# Patient Record
Sex: FEMALE | Race: WHITE | NOT HISPANIC OR LATINO | Employment: FULL TIME | URBAN - METROPOLITAN AREA
[De-identification: names, ages, dates, MRNs, and addresses within clinical notes are randomized per-mention and may not be internally consistent; named-entity substitution may affect disease eponyms.]

---

## 2023-04-13 PROBLEM — F32.A ANXIETY AND DEPRESSION: Status: ACTIVE | Noted: 2023-04-13

## 2023-04-13 PROBLEM — F41.1 GENERALIZED ANXIETY DISORDER WITH PANIC ATTACKS: Status: ACTIVE | Noted: 2023-04-13

## 2023-04-13 PROBLEM — E05.00 GRAVES DISEASE: Status: ACTIVE | Noted: 2023-04-13

## 2023-04-13 PROBLEM — F90.9 ADULT ADHD (ATTENTION DEFICIT HYPERACTIVITY DISORDER): Status: ACTIVE | Noted: 2023-04-13

## 2023-04-13 PROBLEM — F41.0 GENERALIZED ANXIETY DISORDER WITH PANIC ATTACKS: Status: ACTIVE | Noted: 2023-04-13

## 2023-04-13 PROBLEM — I10 PRIMARY HYPERTENSION: Status: ACTIVE | Noted: 2023-04-13

## 2023-04-13 PROBLEM — F41.9 ANXIETY AND DEPRESSION: Status: ACTIVE | Noted: 2023-04-13

## 2023-04-13 RX ORDER — AMLODIPINE BESYLATE 5 MG/1
1 TABLET ORAL DAILY
COMMUNITY
Start: 2022-10-25

## 2023-04-13 RX ORDER — ATOMOXETINE 40 MG/1
1 CAPSULE ORAL
COMMUNITY
Start: 2022-12-06 | End: 2023-04-14

## 2023-04-13 RX ORDER — TRAZODONE HYDROCHLORIDE 100 MG/1
50 TABLET ORAL NIGHTLY
COMMUNITY
Start: 2022-10-25 | End: 2023-11-13 | Stop reason: ALTCHOICE

## 2023-04-13 RX ORDER — VENLAFAXINE HYDROCHLORIDE 150 MG/1
1 CAPSULE, EXTENDED RELEASE ORAL DAILY
COMMUNITY
Start: 2022-03-10 | End: 2023-04-28

## 2023-04-14 ENCOUNTER — OFFICE VISIT (OUTPATIENT)
Dept: PRIMARY CARE | Facility: CLINIC | Age: 38
End: 2023-04-14
Payer: COMMERCIAL

## 2023-04-14 VITALS
DIASTOLIC BLOOD PRESSURE: 78 MMHG | SYSTOLIC BLOOD PRESSURE: 104 MMHG | BODY MASS INDEX: 23.74 KG/M2 | WEIGHT: 134 LBS | RESPIRATION RATE: 16 BRPM | HEART RATE: 68 BPM | HEIGHT: 63 IN

## 2023-04-14 DIAGNOSIS — F90.9 ADULT ADHD (ATTENTION DEFICIT HYPERACTIVITY DISORDER): Primary | ICD-10-CM

## 2023-04-14 DIAGNOSIS — F32.A ANXIETY AND DEPRESSION: ICD-10-CM

## 2023-04-14 DIAGNOSIS — E05.00 GRAVES DISEASE: ICD-10-CM

## 2023-04-14 DIAGNOSIS — I10 PRIMARY HYPERTENSION: ICD-10-CM

## 2023-04-14 DIAGNOSIS — F41.1 GENERALIZED ANXIETY DISORDER WITH PANIC ATTACKS: ICD-10-CM

## 2023-04-14 DIAGNOSIS — F41.9 ANXIETY AND DEPRESSION: ICD-10-CM

## 2023-04-14 DIAGNOSIS — F41.0 GENERALIZED ANXIETY DISORDER WITH PANIC ATTACKS: ICD-10-CM

## 2023-04-14 PROCEDURE — 3078F DIAST BP <80 MM HG: CPT | Performed by: INTERNAL MEDICINE

## 2023-04-14 PROCEDURE — 99213 OFFICE O/P EST LOW 20 MIN: CPT | Performed by: INTERNAL MEDICINE

## 2023-04-14 PROCEDURE — 3074F SYST BP LT 130 MM HG: CPT | Performed by: INTERNAL MEDICINE

## 2023-04-14 RX ORDER — ATOMOXETINE 80 MG/1
80 CAPSULE ORAL DAILY
Qty: 30 CAPSULE | Refills: 0 | Status: SHIPPED | OUTPATIENT
Start: 2023-04-14 | End: 2023-04-20

## 2023-04-14 ASSESSMENT — ANXIETY QUESTIONNAIRES
IF YOU CHECKED OFF ANY PROBLEMS ON THIS QUESTIONNAIRE, HOW DIFFICULT HAVE THESE PROBLEMS MADE IT FOR YOU TO DO YOUR WORK, TAKE CARE OF THINGS AT HOME, OR GET ALONG WITH OTHER PEOPLE: NOT DIFFICULT AT ALL
6. BECOMING EASILY ANNOYED OR IRRITABLE: NOT AT ALL
4. TROUBLE RELAXING: NOT AT ALL
1. FEELING NERVOUS, ANXIOUS, OR ON EDGE: NOT AT ALL
5. BEING SO RESTLESS THAT IT IS HARD TO SIT STILL: NOT AT ALL
3. WORRYING TOO MUCH ABOUT DIFFERENT THINGS: NOT AT ALL
7. FEELING AFRAID AS IF SOMETHING AWFUL MIGHT HAPPEN: NOT AT ALL
2. NOT BEING ABLE TO STOP OR CONTROL WORRYING: NOT AT ALL
GAD7 TOTAL SCORE: 0

## 2023-04-14 ASSESSMENT — PATIENT HEALTH QUESTIONNAIRE - PHQ9
2. FEELING DOWN, DEPRESSED OR HOPELESS: NOT AT ALL
1. LITTLE INTEREST OR PLEASURE IN DOING THINGS: NOT AT ALL
SUM OF ALL RESPONSES TO PHQ9 QUESTIONS 1 AND 2: 0

## 2023-04-14 NOTE — ASSESSMENT & PLAN NOTE
Minimal response seen with Strattera 40 mg daily will increase to 80 mg daily and reevaluate no side effects if no efficacy we will consider Concerta or methylphenidate patient with 2 children with ADHD and autism

## 2023-04-14 NOTE — ASSESSMENT & PLAN NOTE
Currently in remission at this time monitor thyroid functions on yearly basis clinically euthyroid in December no signs or symptoms of overt hyperthyroidism

## 2023-04-14 NOTE — ASSESSMENT & PLAN NOTE
Currently in remission with venlafaxine 150 mg 1 tablet daily and trazodone 100 mg nightly for sleep

## 2023-04-14 NOTE — PROGRESS NOTES
"Subjective   Patient ID: Arlene Moore is a 38 y.o. female who presents for Follow-up (Discuss the ADHD medication).    HPI     Review of Systems    Objective   /78   Pulse 68   Resp 16   Ht 1.6 m (5' 3\")   Wt 60.8 kg (134 lb)   BMI 23.74 kg/m²     Physical Exam    Assessment/Plan          "

## 2023-04-14 NOTE — PROGRESS NOTES
"Subjective   Reason for Visit: Arlene Moore is an 38 y.o. female here for a fu  visit.     Past Medical, Surgical, and Family History reviewed and updated in chart.    Reviewed all medications by prescribing practitioner or clinical pharmacist (such as prescriptions, OTCs, herbal therapies and supplements) and documented in the medical record.    HPI    Patient Care Team:  Jason Luna DO as PCP - General  Jason Luna DO as PCP - [facility] PCP     Review of Systems   All other systems reviewed and are negative.      Objective   Vitals:  /78   Pulse 68   Resp 16   Ht 1.6 m (5' 3\")   Wt 60.8 kg (134 lb)   BMI 23.74 kg/m²       Physical Exam  Vitals and nursing note reviewed.   Constitutional:       General: She is not in acute distress.     Appearance: Normal appearance. She is well-developed. She is not toxic-appearing.   HENT:      Head: Normocephalic and atraumatic.      Right Ear: Tympanic membrane and external ear normal.      Left Ear: Tympanic membrane and external ear normal.      Nose: Nose normal.      Mouth/Throat:      Mouth: Mucous membranes are moist.      Pharynx: Oropharynx is clear. No oropharyngeal exudate or posterior oropharyngeal erythema.      Tonsils: No tonsillar exudate. 2+ on the right. 2+ on the left.   Eyes:      Extraocular Movements: Extraocular movements intact.      Conjunctiva/sclera: Conjunctivae normal.   Cardiovascular:      Rate and Rhythm: Normal rate and regular rhythm.      Pulses: Normal pulses.      Heart sounds: Normal heart sounds. No murmur heard.  Pulmonary:      Effort: Pulmonary effort is normal.      Breath sounds: Normal breath sounds.   Abdominal:      General: Abdomen is flat. Bowel sounds are normal.      Palpations: Abdomen is soft.   Musculoskeletal:      Cervical back: Neck supple.   Lymphadenopathy:      Cervical: No cervical adenopathy.   Skin:     General: Skin is warm and dry.      Findings: No rash.   Neurological:      Mental " Status: She is alert. Mental status is at baseline.   Psychiatric:         Mood and Affect: Mood normal.         Behavior: Behavior normal.         Thought Content: Thought content normal.         Judgment: Judgment normal.         Assessment/Plan   Problem List Items Addressed This Visit          Circulatory    Primary hypertension    Current Assessment & Plan     blood pressure control seen on amlodipine 5 mg daily continue         Relevant Orders    Follow Up In [facility] - PCP       Endocrine/Metabolic    Graves disease    Current Assessment & Plan     Currently in remission at this time monitor thyroid functions on yearly basis clinically euthyroid in December no signs or symptoms of overt hyperthyroidism         Relevant Orders    Follow Up In [facility] - PCP       Other    Adult ADHD (attention deficit hyperactivity disorder) - Primary    Current Assessment & Plan     Minimal response seen with Strattera 40 mg daily will increase to 80 mg daily and reevaluate no side effects if no efficacy we will consider Concerta or methylphenidate patient with 2 children with ADHD and autism         Relevant Medications    atomoxetine (Strattera) 80 mg capsule    Other Relevant Orders    Follow Up In [facility] - PCP    Anxiety and depression    Current Assessment & Plan     Currently in remission with venlafaxine 150 mg 1 tablet daily and trazodone 100 mg nightly for sleep         Relevant Orders    Follow [facility] - PCP    Follow [facility]    Generalized anxiety disorder with panic attacks    Current Assessment & Plan     Stable on venlafaxine 150 mg 1 tablet daily         Relevant Orders    Follow Up [facility] - PCP    Follow Up [facility]

## 2023-04-20 ENCOUNTER — TELEPHONE (OUTPATIENT)
Dept: PRIMARY CARE | Facility: CLINIC | Age: 38
End: 2023-04-20
Payer: COMMERCIAL

## 2023-04-20 DIAGNOSIS — F90.9 ADULT ADHD (ATTENTION DEFICIT HYPERACTIVITY DISORDER): Primary | ICD-10-CM

## 2023-04-20 RX ORDER — ATOMOXETINE 100 MG/1
100 CAPSULE ORAL DAILY
Qty: 30 CAPSULE | Refills: 11 | Status: SHIPPED | OUTPATIENT
Start: 2023-04-20 | End: 2023-08-10

## 2023-04-20 NOTE — TELEPHONE ENCOUNTER
She called in cause what Dr. Luna sent for the Atomoxetine 80 mg is what she has been taking and she wanted to know if he is going to increase it please call her at [phone]

## 2023-04-28 DIAGNOSIS — F41.0 PANIC DISORDER (EPISODIC PAROXYSMAL ANXIETY): ICD-10-CM

## 2023-04-28 DIAGNOSIS — F41.1 GENERALIZED ANXIETY DISORDER: ICD-10-CM

## 2023-04-28 RX ORDER — VENLAFAXINE HYDROCHLORIDE 150 MG/1
CAPSULE, EXTENDED RELEASE ORAL
Qty: 30 CAPSULE | Refills: 5 | Status: SHIPPED | OUTPATIENT
Start: 2023-04-28 | End: 2023-08-24 | Stop reason: SDUPTHER

## 2023-07-14 ENCOUNTER — APPOINTMENT (OUTPATIENT)
Dept: PRIMARY CARE | Facility: CLINIC | Age: 38
End: 2023-07-14
Payer: COMMERCIAL

## 2023-08-03 ENCOUNTER — TELEPHONE (OUTPATIENT)
Dept: PRIMARY CARE | Facility: CLINIC | Age: 38
End: 2023-08-03
Payer: COMMERCIAL

## 2023-08-10 ENCOUNTER — OFFICE VISIT (OUTPATIENT)
Dept: PRIMARY CARE | Facility: CLINIC | Age: 38
End: 2023-08-10
Payer: COMMERCIAL

## 2023-08-10 VITALS
DIASTOLIC BLOOD PRESSURE: 88 MMHG | HEART RATE: 64 BPM | HEIGHT: 63 IN | WEIGHT: 134 LBS | BODY MASS INDEX: 23.74 KG/M2 | SYSTOLIC BLOOD PRESSURE: 128 MMHG

## 2023-08-10 DIAGNOSIS — F90.9 ADULT ADHD (ATTENTION DEFICIT HYPERACTIVITY DISORDER): Primary | ICD-10-CM

## 2023-08-10 DIAGNOSIS — I10 PRIMARY HYPERTENSION: ICD-10-CM

## 2023-08-10 DIAGNOSIS — F32.A ANXIETY AND DEPRESSION: ICD-10-CM

## 2023-08-10 DIAGNOSIS — F41.9 ANXIETY AND DEPRESSION: ICD-10-CM

## 2023-08-10 DIAGNOSIS — E05.00 GRAVES DISEASE: ICD-10-CM

## 2023-08-10 DIAGNOSIS — F41.0 GENERALIZED ANXIETY DISORDER WITH PANIC ATTACKS: ICD-10-CM

## 2023-08-10 DIAGNOSIS — F41.1 GENERALIZED ANXIETY DISORDER WITH PANIC ATTACKS: ICD-10-CM

## 2023-08-10 PROCEDURE — 3079F DIAST BP 80-89 MM HG: CPT | Performed by: INTERNAL MEDICINE

## 2023-08-10 PROCEDURE — 3074F SYST BP LT 130 MM HG: CPT | Performed by: INTERNAL MEDICINE

## 2023-08-10 PROCEDURE — 99213 OFFICE O/P EST LOW 20 MIN: CPT | Performed by: INTERNAL MEDICINE

## 2023-08-10 RX ORDER — LISDEXAMFETAMINE DIMESYLATE 30 MG/1
30 CAPSULE ORAL EVERY MORNING
Qty: 30 CAPSULE | Refills: 0 | Status: SHIPPED | OUTPATIENT
Start: 2023-08-10 | End: 2023-08-10

## 2023-08-10 ASSESSMENT — ENCOUNTER SYMPTOMS
SLEEP DISTURBANCE: 1
NERVOUS/ANXIOUS: 1
DECREASED CONCENTRATION: 1

## 2023-08-10 NOTE — ASSESSMENT & PLAN NOTE
Stable on amlodipine 5 mg daily element of whitecoat hypertension and nervousness anxiousness contributing to elevated blood pressures in office

## 2023-08-10 NOTE — PROGRESS NOTES
"Subjective   Reason for Visit: Arlene Moore is an 38 y.o. female here for a visit.     Past Medical, Surgical, and Family History reviewed and updated in chart.    Reviewed all medications by prescribing practitioner or clinical pharmacist (such as prescriptions, OTCs, herbal therapies and supplements) and documented in the medical record.    History of anxiety depression symptoms controlled on venlafaxine.  Sleep has improved taking half doses of trazodone only as needed.  No significant response seen with trial of atomoxetine.  Discussed trial of placing patient on scheduled medication such as Vyvanse or Adderall in treatment of adult ADHD.  2 children diagnosed with early ADHD.  Patient has been functioning okay at work but at times concentration and difficulty in getting things done has been causing increased stress and she gets particularly angry with inability to complete necessary tasks at home and work at times.  She admits to recreational medicinal use of cannabis to alleviate stress.  She does not wish to start any scheduled medication at this time but is amenable to individual behavioral health counseling        Patient Care Team:  Jason Luna DO as PCP - General  Jason Luna DO as PCP - [facility] PCP     Review of Systems   Psychiatric/Behavioral:  Positive for decreased concentration and sleep disturbance. The patient is nervous/anxious.        Objective   Vitals:  /88 (BP Location: Right arm, Patient Position: Sitting)   Pulse 64   Ht 1.6 m (5' 3\")   Wt 60.8 kg (134 lb)   BMI 23.74 kg/m²       Physical Exam  Vitals and nursing note reviewed.   Constitutional:       General: She is not in acute distress.     Appearance: Normal appearance. She is well-developed. She is not toxic-appearing.   HENT:      Head: Normocephalic and atraumatic.      Right Ear: Tympanic membrane and external ear normal.      Left Ear: Tympanic membrane and external ear normal.      Nose: Nose normal.     "  Mouth/Throat:      Mouth: Mucous membranes are moist.      Pharynx: Oropharynx is clear. No oropharyngeal exudate or posterior oropharyngeal erythema.      Tonsils: No tonsillar exudate. 2+ on the right. 2+ on the left.   Eyes:      Extraocular Movements: Extraocular movements intact.      Conjunctiva/sclera: Conjunctivae normal.   Cardiovascular:      Rate and Rhythm: Normal rate and regular rhythm.      Pulses: Normal pulses.      Heart sounds: Normal heart sounds. No murmur heard.  Pulmonary:      Effort: Pulmonary effort is normal.      Breath sounds: Normal breath sounds.   Abdominal:      General: Abdomen is flat. Bowel sounds are normal.      Palpations: Abdomen is soft.   Musculoskeletal:      Cervical back: Neck supple.   Lymphadenopathy:      Cervical: No cervical adenopathy.   Skin:     General: Skin is warm and dry.      Findings: No rash.   Neurological:      Mental Status: She is alert. Mental status is at baseline.   Psychiatric:         Mood and Affect: Mood normal.         Behavior: Behavior normal.         Thought Content: Thought content normal.         Judgment: Judgment normal.         Assessment/Plan   Problem List Items Addressed This Visit       Adult ADHD (attention deficit hyperactivity disorder) - Primary    Current Assessment & Plan     Patient defers advanced therapy at this time no response seen with atomoxetine at higher doses patient defers starting stimulant medication but is amenable to behavioral health services and assisting with coping and life skills at home particularly to help with family situation with her children who are also undergoing significant behavioral health disturbances.  She has a single parent but does have good family support from her parents denies wanting to injure herself or her children but feels angry all the time due to life stress reevaluate in 3 months behavioral health resubmitted         Relevant Orders    Follow [facility] In [facility] - PCP -  Established    Follow Up [facility] - [facility]    Anxiety and depression    Current Assessment & Plan     Currently improved on medication referral to behavioral health         Relevant Orders    Follow Up In [facility] - [facility]    Generalized anxiety disorder with panic attacks    Current Assessment & Plan     Patient with control of anxiety and anxiety symptoms on venlafaxine continue         Relevant Orders    Follow Up In [facility] - [facility]    Graves disease    Current Assessment & Plan     Currently in remission continue to monitor with lab work         Primary hypertension    Current Assessment & Plan     Stable on amlodipine 5 mg daily element of whitecoat hypertension and nervousness anxiousness contributing to elevated blood pressures in office

## 2023-08-10 NOTE — PROGRESS NOTES
"Subjective   Patient ID: Arlene Moore is a 38 y.o. female who presents for Follow-up.    HPI     Review of Systems    Objective   /88 (BP Location: Right arm, Patient Position: Sitting)   Pulse 64   Ht 1.6 m (5' 3\")   Wt 60.8 kg (134 lb)   BMI 23.74 kg/m²     Physical Exam    Assessment/Plan          "

## 2023-08-10 NOTE — ASSESSMENT & PLAN NOTE
Patient defers advanced therapy at this time no response seen with atomoxetine at higher doses patient defers starting stimulant medication but is amenable to behavioral health services and assisting with coping and life skills at home particularly to help with family situation with her children who are also undergoing significant behavioral health disturbances.  She has a single parent but does have good family support from her parents denies wanting to injure herself or her children but feels angry all the time due to life stress reevaluate in 3 months behavioral health resubmitted

## 2023-08-24 ENCOUNTER — PATIENT MESSAGE (OUTPATIENT)
Dept: PRIMARY CARE | Facility: CLINIC | Age: 38
End: 2023-08-24
Payer: COMMERCIAL

## 2023-08-24 DIAGNOSIS — F41.1 GENERALIZED ANXIETY DISORDER: ICD-10-CM

## 2023-08-24 DIAGNOSIS — F41.0 PANIC DISORDER (EPISODIC PAROXYSMAL ANXIETY): ICD-10-CM

## 2023-08-24 RX ORDER — VENLAFAXINE HYDROCHLORIDE 150 MG/1
150 CAPSULE, EXTENDED RELEASE ORAL
Qty: 30 CAPSULE | Refills: 5 | Status: SHIPPED | OUTPATIENT
Start: 2023-08-24 | End: 2023-11-13 | Stop reason: SDUPTHER

## 2023-09-25 ENCOUNTER — TELEPHONE (OUTPATIENT)
Dept: PRIMARY CARE | Facility: CLINIC | Age: 38
End: 2023-09-25
Payer: COMMERCIAL

## 2023-09-25 NOTE — TELEPHONE ENCOUNTER
Patient went to Urgent Care a week ago Saturday for a UTI and now it's back and she is having burning and pressure.     **Patient isn't sure of the name of medication she was given but it was stronger then Macrobid

## 2023-09-26 DIAGNOSIS — R30.9 PAIN WITH URINATION: ICD-10-CM

## 2023-09-26 DIAGNOSIS — R31.9 URINARY TRACT INFECTION WITH HEMATURIA, SITE UNSPECIFIED: ICD-10-CM

## 2023-09-26 DIAGNOSIS — N39.0 URINARY TRACT INFECTION WITH HEMATURIA, SITE UNSPECIFIED: ICD-10-CM

## 2023-09-29 LAB — URINE CULTURE: ABNORMAL

## 2023-10-02 DIAGNOSIS — N30.00 ACUTE CYSTITIS WITHOUT HEMATURIA: Primary | ICD-10-CM

## 2023-10-05 ENCOUNTER — OFFICE VISIT (OUTPATIENT)
Dept: PRIMARY CARE | Facility: CLINIC | Age: 38
End: 2023-10-05
Payer: COMMERCIAL

## 2023-10-05 DIAGNOSIS — R30.0 BURNING WITH URINATION: ICD-10-CM

## 2023-10-05 DIAGNOSIS — N30.00 ACUTE CYSTITIS WITHOUT HEMATURIA: ICD-10-CM

## 2023-10-05 DIAGNOSIS — R30.0 DYSURIA: ICD-10-CM

## 2023-10-05 DIAGNOSIS — N39.0 URINARY TRACT INFECTION WITH HEMATURIA, SITE UNSPECIFIED: ICD-10-CM

## 2023-10-05 DIAGNOSIS — R31.9 URINARY TRACT INFECTION WITH HEMATURIA, SITE UNSPECIFIED: ICD-10-CM

## 2023-10-05 LAB
POC APPEARANCE, URINE: ABNORMAL
POC BILIRUBIN, URINE: ABNORMAL
POC BLOOD, URINE: ABNORMAL
POC COLOR, URINE: YELLOW
POC GLUCOSE, URINE: ABNORMAL MG/DL
POC KETONES, URINE: ABNORMAL MG/DL
POC LEUKOCYTES, URINE: ABNORMAL
POC NITRITE,URINE: POSITIVE
POC PH, URINE: 5 PH
POC PROTEIN, URINE: ABNORMAL MG/DL
POC SPECIFIC GRAVITY, URINE: 1.02
POC UROBILINOGEN, URINE: 0.2 EU/DL

## 2023-10-05 PROCEDURE — 81002 URINALYSIS NONAUTO W/O SCOPE: CPT | Performed by: INTERNAL MEDICINE

## 2023-10-05 PROCEDURE — 87186 SC STD MICRODIL/AGAR DIL: CPT

## 2023-10-05 PROCEDURE — 87086 URINE CULTURE/COLONY COUNT: CPT

## 2023-10-05 RX ORDER — SULFAMETHOXAZOLE AND TRIMETHOPRIM 800; 160 MG/1; MG/1
1 TABLET ORAL 2 TIMES DAILY
Qty: 6 TABLET | Refills: 0 | Status: SHIPPED | OUTPATIENT
Start: 2023-10-05 | End: 2023-10-08

## 2023-10-05 NOTE — PROGRESS NOTES
Subjective   Patient ID: Arlene Moore is a 38 y.o. female who presents for Nurse Visit (UTI Symptoms ).    HPI     Review of Systems    Objective   There were no vitals taken for this visit.    Physical Exam    Assessment/Plan

## 2023-10-08 LAB — BACTERIA UR CULT: ABNORMAL

## 2023-10-18 ENCOUNTER — PATIENT MESSAGE (OUTPATIENT)
Dept: PRIMARY CARE | Facility: CLINIC | Age: 38
End: 2023-10-18
Payer: COMMERCIAL

## 2023-10-18 DIAGNOSIS — N30.00 ACUTE CYSTITIS WITHOUT HEMATURIA: Primary | ICD-10-CM

## 2023-10-18 RX ORDER — SULFAMETHOXAZOLE AND TRIMETHOPRIM 800; 160 MG/1; MG/1
1 TABLET ORAL 2 TIMES DAILY
Qty: 6 TABLET | Refills: 0 | Status: SHIPPED | OUTPATIENT
Start: 2023-10-18 | End: 2023-10-21

## 2023-11-07 ENCOUNTER — PATIENT MESSAGE (OUTPATIENT)
Dept: PRIMARY CARE | Facility: CLINIC | Age: 38
End: 2023-11-07
Payer: COMMERCIAL

## 2023-11-07 DIAGNOSIS — N30.00 ACUTE CYSTITIS WITHOUT HEMATURIA: Primary | ICD-10-CM

## 2023-11-07 RX ORDER — SULFAMETHOXAZOLE AND TRIMETHOPRIM 800; 160 MG/1; MG/1
1 TABLET ORAL 2 TIMES DAILY
Qty: 6 TABLET | Refills: 0 | Status: SHIPPED | OUTPATIENT
Start: 2023-11-07 | End: 2023-11-13 | Stop reason: ALTCHOICE

## 2023-11-08 DIAGNOSIS — Z23 FLU VACCINE NEED: ICD-10-CM

## 2023-11-13 ENCOUNTER — OFFICE VISIT (OUTPATIENT)
Dept: PRIMARY CARE | Facility: CLINIC | Age: 38
End: 2023-11-13
Payer: COMMERCIAL

## 2023-11-13 VITALS
HEART RATE: 68 BPM | HEIGHT: 63 IN | DIASTOLIC BLOOD PRESSURE: 82 MMHG | BODY MASS INDEX: 24.45 KG/M2 | SYSTOLIC BLOOD PRESSURE: 132 MMHG | WEIGHT: 138 LBS

## 2023-11-13 DIAGNOSIS — F41.0 PANIC DISORDER (EPISODIC PAROXYSMAL ANXIETY): ICD-10-CM

## 2023-11-13 DIAGNOSIS — F41.1 GENERALIZED ANXIETY DISORDER: ICD-10-CM

## 2023-11-13 DIAGNOSIS — Z23 FLU VACCINE NEED: ICD-10-CM

## 2023-11-13 DIAGNOSIS — I10 PRIMARY HYPERTENSION: Primary | ICD-10-CM

## 2023-11-13 DIAGNOSIS — E05.00 GRAVES DISEASE: ICD-10-CM

## 2023-11-13 DIAGNOSIS — N39.0 RECURRENT UTI (URINARY TRACT INFECTION): ICD-10-CM

## 2023-11-13 DIAGNOSIS — F90.9 ADULT ADHD (ATTENTION DEFICIT HYPERACTIVITY DISORDER): ICD-10-CM

## 2023-11-13 LAB
POC APPEARANCE, URINE: CLEAR
POC BILIRUBIN, URINE: NEGATIVE
POC BLOOD, URINE: ABNORMAL
POC COLOR, URINE: ABNORMAL
POC GLUCOSE, URINE: ABNORMAL MG/DL
POC KETONES, URINE: ABNORMAL MG/DL
POC LEUKOCYTES, URINE: NEGATIVE
POC NITRITE,URINE: NEGATIVE
POC PH, URINE: 6 PH
POC PROTEIN, URINE: ABNORMAL MG/DL
POC SPECIFIC GRAVITY, URINE: >=1.03
POC UROBILINOGEN, URINE: 0.2 EU/DL

## 2023-11-13 PROCEDURE — 90471 IMMUNIZATION ADMIN: CPT | Performed by: INTERNAL MEDICINE

## 2023-11-13 PROCEDURE — 90686 IIV4 VACC NO PRSV 0.5 ML IM: CPT | Performed by: INTERNAL MEDICINE

## 2023-11-13 PROCEDURE — 3075F SYST BP GE 130 - 139MM HG: CPT | Performed by: INTERNAL MEDICINE

## 2023-11-13 PROCEDURE — 81002 URINALYSIS NONAUTO W/O SCOPE: CPT | Performed by: INTERNAL MEDICINE

## 2023-11-13 PROCEDURE — 3079F DIAST BP 80-89 MM HG: CPT | Performed by: INTERNAL MEDICINE

## 2023-11-13 PROCEDURE — 99213 OFFICE O/P EST LOW 20 MIN: CPT | Performed by: INTERNAL MEDICINE

## 2023-11-13 PROCEDURE — 87086 URINE CULTURE/COLONY COUNT: CPT

## 2023-11-13 RX ORDER — CLONIDINE 0.1 MG/24H
PATCH, EXTENDED RELEASE TRANSDERMAL
Qty: 12 PATCH | Refills: 3 | Status: SHIPPED | OUTPATIENT
Start: 2023-11-13 | End: 2024-04-19 | Stop reason: SINTOL

## 2023-11-13 RX ORDER — VENLAFAXINE HYDROCHLORIDE 150 MG/1
150 CAPSULE, EXTENDED RELEASE ORAL
Qty: 180 CAPSULE | Refills: 3 | Status: SHIPPED | OUTPATIENT
Start: 2023-11-13 | End: 2024-11-12

## 2023-11-13 RX ORDER — NITROFURANTOIN 25; 75 MG/1; MG/1
100 CAPSULE ORAL 2 TIMES DAILY
Qty: 30 CAPSULE | Refills: 0 | Status: SHIPPED | OUTPATIENT
Start: 2023-11-13 | End: 2023-12-13

## 2023-11-13 NOTE — ASSESSMENT & PLAN NOTE
Continue venlafaxine  mg daily patient having some episodes of heat intolerance we will check thyroid function placed on clonidine reevaluate for Graves' hyperthyroidism with blood work reevaluate in 3 months

## 2023-11-13 NOTE — PROGRESS NOTES
"Subjective   Patient ID: Arlene Moore is a 38 y.o. female who presents for Follow-up (Reoccurring UTI).    HPI     Review of Systems    Objective   /82 (BP Location: Right arm, Patient Position: Sitting)   Pulse 68   Ht 1.6 m (5' 3\")   Wt 62.6 kg (138 lb)   BMI 24.45 kg/m²     Physical Exam    Assessment/Plan          "

## 2023-11-13 NOTE — ASSESSMENT & PLAN NOTE
Did not tolerate trials of medication most likely related to level of anxiety stabilized with venlafaxine

## 2023-11-13 NOTE — PROGRESS NOTES
"Subjective   Reason for Visit: Arlene Moore is an 38 y.o. female here for a fu visit.     Past Medical, Surgical, and Family History reviewed and updated in chart.    Reviewed all medications by prescribing practitioner or clinical pharmacist (such as prescriptions, OTCs, herbal therapies and supplements) and documented in the medical record.    HPI    Patient Care Team:  Jason Luna DO as PCP - General  Jason Luna DO as PCP - [facility] PCP     Review of Systems   All other systems reviewed and are negative.      Objective   Vitals:  /82 (BP Location: Right arm, Patient Position: Sitting)   Pulse 68   Ht 1.6 m (5' 3\")   Wt 62.6 kg (138 lb)   BMI 24.45 kg/m²       Physical Exam  Vitals and nursing note reviewed.   Constitutional:       General: She is not in acute distress.     Appearance: Normal appearance. She is well-developed. She is not toxic-appearing.   HENT:      Head: Normocephalic and atraumatic.      Right Ear: Tympanic membrane and external ear normal.      Left Ear: Tympanic membrane and external ear normal.      Nose: Nose normal.      Mouth/Throat:      Mouth: Mucous membranes are moist.      Pharynx: Oropharynx is clear. No oropharyngeal exudate or posterior oropharyngeal erythema.      Tonsils: No tonsillar exudate. 2+ on the right. 2+ on the left.   Eyes:      Extraocular Movements: Extraocular movements intact.      Conjunctiva/sclera: Conjunctivae normal.   Cardiovascular:      Rate and Rhythm: Normal rate and regular rhythm.      Pulses: Normal pulses.      Heart sounds: Normal heart sounds. No murmur heard.  Pulmonary:      Effort: Pulmonary effort is normal.      Breath sounds: Normal breath sounds.   Abdominal:      General: Abdomen is flat. Bowel sounds are normal.      Palpations: Abdomen is soft.   Musculoskeletal:      Cervical back: Neck supple.   Lymphadenopathy:      Cervical: No cervical adenopathy.   Skin:     General: Skin is warm and dry.      Findings: No " rash.   Neurological:      Mental Status: She is alert. Mental status is at baseline.   Psychiatric:         Mood and Affect: Mood normal.         Behavior: Behavior normal.         Thought Content: Thought content normal.         Judgment: Judgment normal.         Assessment/Plan   Problem List Items Addressed This Visit       Adult ADHD (attention deficit hyperactivity disorder)    Current Assessment & Plan     Did not tolerate trials of medication most likely related to level of anxiety stabilized with venlafaxine         Relevant Medications    cloNIDine (Catapres-TTS-1) 0.1 mg/24 hr patch    Other Relevant Orders    Urinalysis with Reflex Microscopic    Referral to Gynecology    CBC and Auto Differential    Tsh With Reflex To Free T4 If Abnormal    Comprehensive metabolic panel    Lipid Panel    Albumin, urine, random    Follow Up In [facility] - PCP - Established    Generalized anxiety disorder    Current Assessment & Plan     Continue venlafaxine  mg daily patient having some episodes of heat intolerance we will check thyroid function placed on clonidine reevaluate for Graves' hyperthyroidism with blood work reevaluate in 3 months         Relevant Medications    venlafaxine XR (Effexor-XR) 150 mg 24 hr capsule    cloNIDine (Catapres-TTS-1) 0.1 mg/24 hr patch    Other Relevant Orders    Urinalysis with Reflex Microscopic    Referral to Gynecology    CBC and Auto Differential    Tsh With Reflex To Free T4 If Abnormal    Comprehensive metabolic panel    Lipid Panel    Albumin, urine, random    Follow Up In [facility] - PCP - Established    Graves disease    Current Assessment & Plan     Reevaluate cardiometabolic function and thyroid clinically euthyroid at this time         Relevant Medications    cloNIDine (Catapres-TTS-1) 0.1 mg/24 hr patch    Other Relevant Orders    Urinalysis with Reflex Microscopic    Referral to Gynecology    CBC and Auto Differential    Tsh With Reflex To  Free T4 If Abnormal    Comprehensive metabolic panel    Lipid Panel    Albumin, urine, random    Follow Up In [facility] - PCP - Established    Primary hypertension - Primary    Current Assessment & Plan     Continues to be mildly elevated in office we will add clonidine patch TTS 1 weekly in the setting of vasomotor symptoms most likely related to higher dose venlafaxine given for management of anxiety with panic features stable         Relevant Orders    Follow Up In [facility] - PCP - Established    Recurrent UTI (urinary tract infection)    Current Assessment & Plan     Patient with IUD with intercourse inducing UTIs recurrent E. coli sensitive repeat urine culture recently treated over the weekend with Bactrim will prescribe prophylactic nitrofurantoin 100 mg to take after intercourse and reevaluate may also consider low-dose therapy to prevent recurrent urethritis referral to gynecologist for cervical cancer screening follow-up 4 years with IUD         Relevant Medications    cloNIDine (Catapres-TTS-1) 0.1 mg/24 hr patch    nitrofurantoin, macrocrystal-monohydrate, (Macrobid) 100 mg capsule    Other Relevant Orders    Urinalysis with Reflex Microscopic    Referral to Gynecology    CBC and Auto Differential    Tsh With Reflex To Free T4 If Abnormal    Comprehensive metabolic panel    Lipid Panel    Albumin, urine, random    Follow Up In [facility] - PCP - Established    Urine Culture    POCT UA (nonautomated) manually resulted (Completed)    Flu vaccine need    Relevant Medications    cloNIDine (Catapres-TTS-1) 0.1 mg/24 hr patch    Other Relevant Orders    Urinalysis with Reflex Microscopic    Referral to Gynecology    CBC and Auto Differential    Tsh With Reflex To Free T4 If Abnormal    Comprehensive metabolic panel    Lipid Panel    Albumin, urine, random    Follow Up In [facility] - PCP - Established    Panic disorder (episodic paroxysmal anxiety)    Current  Assessment & Plan     Stable at this time         Relevant Medications    venlafaxine XR (Effexor-XR) 150 mg 24 hr capsule    cloNIDine (Catapres-TTS-1) 0.1 mg/24 hr patch    Other Relevant Orders    Urinalysis with Reflex Microscopic    Referral to Gynecology    CBC and Auto Differential    Tsh With Reflex To Free T4 If Abnormal    Comprehensive metabolic panel    Lipid Panel    Albumin, urine, random    Follow Up In [facility] - PCP - Established

## 2023-11-13 NOTE — ASSESSMENT & PLAN NOTE
Patient with IUD with intercourse inducing UTIs recurrent E. coli sensitive repeat urine culture recently treated over the weekend with Bactrim will prescribe prophylactic nitrofurantoin 100 mg to take after intercourse and reevaluate may also consider low-dose therapy to prevent recurrent urethritis referral to gynecologist for cervical cancer screening follow-up 4 years with IUD

## 2023-11-13 NOTE — ASSESSMENT & PLAN NOTE
Continues to be mildly elevated in office we will add clonidine patch TTS 1 weekly in the setting of vasomotor symptoms most likely related to higher dose venlafaxine given for management of anxiety with panic features stable continue with amlodipine 5 mg daily check urine microalbumin level check lab work no signs or symptoms of overt Graves' disease at this time

## 2023-11-14 LAB — BACTERIA UR CULT: NORMAL

## 2023-12-22 ENCOUNTER — APPOINTMENT (OUTPATIENT)
Dept: OBSTETRICS AND GYNECOLOGY | Facility: CLINIC | Age: 38
End: 2023-12-22
Payer: COMMERCIAL

## 2024-01-28 PROBLEM — Z01.419 WELL WOMAN EXAM: Status: ACTIVE | Noted: 2024-01-28

## 2024-01-29 ENCOUNTER — OFFICE VISIT (OUTPATIENT)
Dept: OBSTETRICS AND GYNECOLOGY | Facility: CLINIC | Age: 39
End: 2024-01-29
Payer: COMMERCIAL

## 2024-01-29 VITALS
DIASTOLIC BLOOD PRESSURE: 74 MMHG | HEIGHT: 63 IN | BODY MASS INDEX: 24.8 KG/M2 | SYSTOLIC BLOOD PRESSURE: 118 MMHG | WEIGHT: 140 LBS

## 2024-01-29 DIAGNOSIS — F90.9 ADULT ADHD (ATTENTION DEFICIT HYPERACTIVITY DISORDER): ICD-10-CM

## 2024-01-29 DIAGNOSIS — F41.1 GENERALIZED ANXIETY DISORDER: ICD-10-CM

## 2024-01-29 DIAGNOSIS — F41.0 PANIC DISORDER (EPISODIC PAROXYSMAL ANXIETY): ICD-10-CM

## 2024-01-29 DIAGNOSIS — Z01.419 WELL WOMAN EXAM: Primary | ICD-10-CM

## 2024-01-29 DIAGNOSIS — N39.0 RECURRENT UTI (URINARY TRACT INFECTION): ICD-10-CM

## 2024-01-29 DIAGNOSIS — Z23 FLU VACCINE NEED: ICD-10-CM

## 2024-01-29 DIAGNOSIS — Z30.432 ENCOUNTER FOR REMOVAL OF INTRAUTERINE CONTRACEPTIVE DEVICE (IUD): ICD-10-CM

## 2024-01-29 DIAGNOSIS — E05.00 GRAVES DISEASE: ICD-10-CM

## 2024-01-29 PROCEDURE — 58301 REMOVE INTRAUTERINE DEVICE: CPT | Performed by: OBSTETRICS & GYNECOLOGY

## 2024-01-29 PROCEDURE — 3078F DIAST BP <80 MM HG: CPT | Performed by: OBSTETRICS & GYNECOLOGY

## 2024-01-29 PROCEDURE — 3074F SYST BP LT 130 MM HG: CPT | Performed by: OBSTETRICS & GYNECOLOGY

## 2024-01-29 PROCEDURE — 87624 HPV HI-RISK TYP POOLED RSLT: CPT

## 2024-01-29 PROCEDURE — 99385 PREV VISIT NEW AGE 18-39: CPT | Performed by: OBSTETRICS & GYNECOLOGY

## 2024-01-29 PROCEDURE — 4004F PT TOBACCO SCREEN RCVD TLK: CPT | Performed by: OBSTETRICS & GYNECOLOGY

## 2024-01-29 PROCEDURE — 88142 CYTOPATH C/V THIN LAYER: CPT

## 2024-01-29 NOTE — PROGRESS NOTES
Subjective   Patient ID: Arlene Moore is a 38 y.o. female who presents for Annual Exam (IUD removal and annual.).  HPI  38-year-old here for her annual physical exam.  Patient with monthly menses lasting 6 to 7 days.  Patient is otherwise tolerating the Mirena IUD well.  Her new partner has had a vasectomy desires removal of the IUD.  No other concerns.      Objective   Physical Exam  Exam conducted with a chaperone present.   Constitutional:       Appearance: Normal appearance.   Cardiovascular:      Rate and Rhythm: Normal rate and regular rhythm.   Pulmonary:      Effort: Pulmonary effort is normal.      Breath sounds: Normal breath sounds.   Chest:   Breasts:     Right: Normal. No mass or tenderness.      Left: Normal. No mass or tenderness.   Abdominal:      Palpations: Abdomen is soft. There is no mass.      Tenderness: There is no abdominal tenderness.   Genitourinary:     General: Normal vulva.      Vagina: Normal. No lesions.      Cervix: No lesion.      Uterus: Normal. Not enlarged and not tender.       Adnexa: Right adnexa normal and left adnexa normal.        Right: No mass or tenderness.          Left: No mass or tenderness.     Musculoskeletal:      Cervical back: Neck supple.   Skin:     General: Skin is warm and dry.   Neurological:      Mental Status: She is alert and oriented to person, place, and time.   Psychiatric:         Mood and Affect: Mood normal.         Behavior: Behavior normal.         IUD Removal    Date/Time: 1/29/2024 2:24 PM    Performed by: Clif Ulloa MD  Authorized by: Clif Ulloa MD    Consent:     Consent obtained:  Written    Consent given by:  Patient    Procedure risks and benefits discussed: yes    Procedure:     Removed with no complications: yes    Comments:      Partner with a vasectomy, desires removal of the IUD    Assessment/Plan   Problem List Items Addressed This Visit             ICD-10-CM    Adult ADHD (attention deficit hyperactivity disorder)  F90.9    Generalized anxiety disorder F41.1    Graves disease E05.00    Recurrent UTI (urinary tract infection) N39.0    Flu vaccine need Z23    Panic disorder (episodic paroxysmal anxiety) F41.0    Well woman exam - Primary Z01.419     --ANNUAL EXAM: Patient doing well with no concerns.  --CONTRACEPTIONS:  patient with the mirena IUD inserted December 2019, currently with monthly menses lasting 6 to 7 days.  New partner has had a vasectomy, and desires removal of her IUD.  IUD removed and tolerated well.  --Patient with history of a PE: Currently on no blood thinners  --normal PAP and HPV in 2016, will repeat today  --Patient is P3    PLAN:  Pap and HPV performed  Mirena IUD removed  Follow-up in 1 year         Relevant Orders    THINPREP PAP     Other Visit Diagnoses         Codes    Encounter for removal of intrauterine contraceptive device (IUD)     Z30.215

## 2024-01-29 NOTE — ASSESSMENT & PLAN NOTE
--ANNUAL EXAM: Patient doing well with no concerns.  --CONTRACEPTIONS:  patient with the mirena IUD inserted December 2019, currently with monthly menses lasting 6 to 7 days.  New partner has had a vasectomy, and desires removal of her IUD.  IUD removed and tolerated well.  --Patient with history of a PE: Currently on no blood thinners  --normal PAP and HPV in 2016, will repeat today  --Patient is P3    PLAN:  Pap and HPV performed  Mirena IUD removed  Follow-up in 1 year

## 2024-02-13 ENCOUNTER — APPOINTMENT (OUTPATIENT)
Dept: PRIMARY CARE | Facility: CLINIC | Age: 39
End: 2024-02-13
Payer: COMMERCIAL

## 2024-02-13 LAB
CYTOLOGY CMNT CVX/VAG CYTO-IMP: NORMAL
HPV HR 12 DNA GENITAL QL NAA+PROBE: NEGATIVE
HPV HR GENOTYPES PNL CVX NAA+PROBE: NEGATIVE
HPV16 DNA SPEC QL NAA+PROBE: NEGATIVE
HPV18 DNA SPEC QL NAA+PROBE: NEGATIVE
LAB AP CONTRACEPTIVE HISTORY: NORMAL
LAB AP HPV GENOTYPE QUESTION: YES
LAB AP HPV HR: NORMAL
LABORATORY COMMENT REPORT: NORMAL
LABORATORY COMMENT REPORT: NORMAL
LMP START DATE: NORMAL
PATH REPORT.TOTAL CANCER: NORMAL

## 2024-04-19 ENCOUNTER — OFFICE VISIT (OUTPATIENT)
Dept: PRIMARY CARE | Facility: CLINIC | Age: 39
End: 2024-04-19
Payer: COMMERCIAL

## 2024-04-19 VITALS
HEART RATE: 64 BPM | HEIGHT: 63 IN | WEIGHT: 137 LBS | SYSTOLIC BLOOD PRESSURE: 122 MMHG | BODY MASS INDEX: 24.27 KG/M2 | DIASTOLIC BLOOD PRESSURE: 78 MMHG

## 2024-04-19 DIAGNOSIS — F90.9 ADULT ADHD (ATTENTION DEFICIT HYPERACTIVITY DISORDER): ICD-10-CM

## 2024-04-19 DIAGNOSIS — F41.0 PANIC DISORDER (EPISODIC PAROXYSMAL ANXIETY): ICD-10-CM

## 2024-04-19 DIAGNOSIS — N39.0 RECURRENT UTI (URINARY TRACT INFECTION): ICD-10-CM

## 2024-04-19 DIAGNOSIS — F41.1 GENERALIZED ANXIETY DISORDER: ICD-10-CM

## 2024-04-19 DIAGNOSIS — E05.00 GRAVES DISEASE: ICD-10-CM

## 2024-04-19 DIAGNOSIS — Z12.31 SCREENING MAMMOGRAM FOR BREAST CANCER: Primary | ICD-10-CM

## 2024-04-19 DIAGNOSIS — I10 PRIMARY HYPERTENSION: ICD-10-CM

## 2024-04-19 PROBLEM — F32.A ANXIETY AND DEPRESSION: Status: RESOLVED | Noted: 2023-04-13 | Resolved: 2024-04-19

## 2024-04-19 PROBLEM — R09.89 VASOMOTOR PHENOMENON: Status: ACTIVE | Noted: 2024-04-19

## 2024-04-19 PROBLEM — Z23 FLU VACCINE NEED: Status: RESOLVED | Noted: 2023-11-13 | Resolved: 2024-04-19

## 2024-04-19 PROBLEM — F41.9 ANXIETY AND DEPRESSION: Status: RESOLVED | Noted: 2023-04-13 | Resolved: 2024-04-19

## 2024-04-19 PROCEDURE — 3078F DIAST BP <80 MM HG: CPT | Performed by: INTERNAL MEDICINE

## 2024-04-19 PROCEDURE — 4004F PT TOBACCO SCREEN RCVD TLK: CPT | Performed by: INTERNAL MEDICINE

## 2024-04-19 PROCEDURE — 3074F SYST BP LT 130 MM HG: CPT | Performed by: INTERNAL MEDICINE

## 2024-04-19 PROCEDURE — 99213 OFFICE O/P EST LOW 20 MIN: CPT | Performed by: INTERNAL MEDICINE

## 2024-04-19 RX ORDER — CLONIDINE 0.1 MG/24H
PATCH, EXTENDED RELEASE TRANSDERMAL
Qty: 4 PATCH | Refills: 11 | Status: SHIPPED | OUTPATIENT
Start: 2024-04-19 | End: 2024-06-04 | Stop reason: WASHOUT

## 2024-04-19 ASSESSMENT — ANXIETY QUESTIONNAIRES
GAD7 TOTAL SCORE: 0
3. WORRYING TOO MUCH ABOUT DIFFERENT THINGS: NOT AT ALL
1. FEELING NERVOUS, ANXIOUS, OR ON EDGE: NOT AT ALL
2. NOT BEING ABLE TO STOP OR CONTROL WORRYING: NOT AT ALL
7. FEELING AFRAID AS IF SOMETHING AWFUL MIGHT HAPPEN: NOT AT ALL
5. BEING SO RESTLESS THAT IT IS HARD TO SIT STILL: NOT AT ALL
6. BECOMING EASILY ANNOYED OR IRRITABLE: NOT AT ALL
4. TROUBLE RELAXING: NOT AT ALL
IF YOU CHECKED OFF ANY PROBLEMS ON THIS QUESTIONNAIRE, HOW DIFFICULT HAVE THESE PROBLEMS MADE IT FOR YOU TO DO YOUR WORK, TAKE CARE OF THINGS AT HOME, OR GET ALONG WITH OTHER PEOPLE: NOT DIFFICULT AT ALL

## 2024-04-19 ASSESSMENT — PATIENT HEALTH QUESTIONNAIRE - PHQ9
2. FEELING DOWN, DEPRESSED OR HOPELESS: NOT AT ALL
SUM OF ALL RESPONSES TO PHQ9 QUESTIONS 1 AND 2: 0
1. LITTLE INTEREST OR PLEASURE IN DOING THINGS: NOT AT ALL

## 2024-04-19 NOTE — ASSESSMENT & PLAN NOTE
Recent cervical cancer screening stable by gynecologist patient has not had any recurrent UTIs since treatment with nitrofurantoin continue to monitor closely can consider prophylactic use of nitrofurantoin after intercourse

## 2024-04-19 NOTE — ASSESSMENT & PLAN NOTE
Stable on venlafaxine 150 mg extended release twice a day also improvement with coping skills and lifestyle changes

## 2024-04-19 NOTE — PROGRESS NOTES
"Subjective   Patient ID: Arlene Moore is a 39 y.o. female who presents for Follow-up.    HPI     Review of Systems    Objective   /78 (BP Location: Left arm, Patient Position: Sitting)   Pulse 64   Ht 1.6 m (5' 3\")   Wt 62.1 kg (137 lb)   BMI 24.27 kg/m²     Physical Exam    Assessment/Plan          "

## 2024-04-19 NOTE — PROGRESS NOTES
"Subjective   Reason for Visit: Arlene Moore is an 39 y.o. female here for a fu  visit.     Past Medical, Surgical, and Family History reviewed and updated in chart.    Reviewed all medications by prescribing practitioner or clinical pharmacist (such as prescriptions, OTCs, herbal therapies and supplements) and documented in the medical record.    HPI    Patient Care Team:  Jason Luna DO as PCP - General  Jason Luna DO as PCP - [facility] PCP     Review of Systems   All other systems reviewed and are negative.      Objective   Vitals:  /78 (BP Location: Left arm, Patient Position: Sitting)   Pulse 64   Ht 1.6 m (5' 3\")   Wt 62.1 kg (137 lb)   BMI 24.27 kg/m²       Physical Exam  Vitals and nursing note reviewed.   Constitutional:       General: She is not in acute distress.     Appearance: Normal appearance. She is well-developed. She is not toxic-appearing.   HENT:      Head: Normocephalic and atraumatic.      Right Ear: Tympanic membrane and external ear normal.      Left Ear: Tympanic membrane and external ear normal.      Nose: Nose normal.      Mouth/Throat:      Mouth: Mucous membranes are moist.      Pharynx: Oropharynx is clear. No oropharyngeal exudate or posterior oropharyngeal erythema.      Tonsils: No tonsillar exudate. 2+ on the right. 2+ on the left.   Eyes:      Extraocular Movements: Extraocular movements intact.      Conjunctiva/sclera: Conjunctivae normal.   Cardiovascular:      Rate and Rhythm: Normal rate and regular rhythm.      Pulses: Normal pulses.      Heart sounds: Normal heart sounds. No murmur heard.  Pulmonary:      Effort: Pulmonary effort is normal.      Breath sounds: Normal breath sounds.   Abdominal:      General: Abdomen is flat. Bowel sounds are normal.      Palpations: Abdomen is soft.   Musculoskeletal:      Cervical back: Neck supple.   Lymphadenopathy:      Cervical: No cervical adenopathy.   Skin:     General: Skin is warm and dry.      Findings: No " rash.   Neurological:      Mental Status: She is alert. Mental status is at baseline.   Psychiatric:         Mood and Affect: Mood normal.         Behavior: Behavior normal.         Thought Content: Thought content normal.         Judgment: Judgment normal.         Assessment/Plan   Problem List Items Addressed This Visit       RESOLVED: Adult ADHD (attention deficit hyperactivity disorder)    Relevant Medications    cloNIDine (Catapres-TTS-1) 0.1 mg/24 hr patch    Generalized anxiety disorder    Current Assessment & Plan     Stable on venlafaxine 150 mg extended release twice a day also improvement with coping skills and lifestyle changes         Relevant Medications    cloNIDine (Catapres-TTS-1) 0.1 mg/24 hr patch    Other Relevant Orders    BI mammo bilateral screening tomosynthesis    Follow Up In [facility] - PCP - Established    Graves disease    Current Assessment & Plan     Reevaluate blood work no evidence of hyperthyroidism on clinical exam today continue         Relevant Medications    cloNIDine (Catapres-TTS-1) 0.1 mg/24 hr patch    Other Relevant Orders    BI mammo bilateral screening tomosynthesis    Follow Up [facility] - PCP - Established    Primary hypertension    Current Assessment & Plan     Good blood pressure control with amlodipine 5 mg daily         Relevant Orders    BI mammo bilateral screening tomosynthesis    Follow Up [facility] - PCP - Established    Recurrent UTI (urinary tract infection)    Current Assessment & Plan     Recent cervical cancer screening stable by gynecologist patient has not had any recurrent UTIs since treatment with nitrofurantoin continue to monitor closely can consider prophylactic use of nitrofurantoin after intercourse         Relevant Medications    cloNIDine (Catapres-TTS-1) 0.1 mg/24 hr patch    Panic disorder (episodic paroxysmal anxiety)    Current Assessment & Plan     Stable on venlafaxine 150 mg twice a day          Relevant Medications    cloNIDine (Catapres-TTS-1) 0.1 mg/24 hr patch    Other Relevant Orders    BI mammo bilateral screening tomosynthesis    Follow Up In [facility] - PCP - Established    Screening mammogram for breast cancer - Primary    Current Assessment & Plan     Schedule mammogram

## 2024-04-29 ENCOUNTER — LAB (OUTPATIENT)
Dept: LAB | Facility: LAB | Age: 39
End: 2024-04-29
Payer: COMMERCIAL

## 2024-04-29 ENCOUNTER — HOSPITAL ENCOUNTER (OUTPATIENT)
Dept: RADIOLOGY | Facility: HOSPITAL | Age: 39
Discharge: HOME | End: 2024-04-29
Payer: COMMERCIAL

## 2024-04-29 DIAGNOSIS — E05.00 GRAVES DISEASE: ICD-10-CM

## 2024-04-29 DIAGNOSIS — F41.1 GENERALIZED ANXIETY DISORDER: ICD-10-CM

## 2024-04-29 DIAGNOSIS — I10 PRIMARY HYPERTENSION: ICD-10-CM

## 2024-04-29 DIAGNOSIS — N39.0 RECURRENT UTI (URINARY TRACT INFECTION): ICD-10-CM

## 2024-04-29 DIAGNOSIS — R80.9 TYPE 1 DIABETES MELLITUS WITH DIABETIC MICROALBUMINURIA (MULTI): ICD-10-CM

## 2024-04-29 DIAGNOSIS — F90.9 ADULT ADHD (ATTENTION DEFICIT HYPERACTIVITY DISORDER): ICD-10-CM

## 2024-04-29 DIAGNOSIS — E11.9 DIABETES MELLITUS, NEW ONSET (MULTI): Primary | ICD-10-CM

## 2024-04-29 DIAGNOSIS — E10.29 TYPE 1 DIABETES MELLITUS WITH DIABETIC MICROALBUMINURIA (MULTI): ICD-10-CM

## 2024-04-29 DIAGNOSIS — Z23 FLU VACCINE NEED: ICD-10-CM

## 2024-04-29 DIAGNOSIS — F41.0 PANIC DISORDER (EPISODIC PAROXYSMAL ANXIETY): ICD-10-CM

## 2024-04-29 LAB
ALBUMIN SERPL BCP-MCNC: 4.4 G/DL (ref 3.4–5)
ALP SERPL-CCNC: 76 U/L (ref 33–110)
ALT SERPL W P-5'-P-CCNC: 26 U/L (ref 7–45)
ANION GAP SERPL CALC-SCNC: 10 MMOL/L (ref 10–20)
AST SERPL W P-5'-P-CCNC: 18 U/L (ref 9–39)
B-OH-BUTYR SERPL-SCNC: 0.05 MMOL/L (ref 0.02–0.27)
BASOPHILS # BLD AUTO: 0.05 X10*3/UL (ref 0–0.1)
BASOPHILS NFR BLD AUTO: 0.7 %
BILIRUB SERPL-MCNC: 0.4 MG/DL (ref 0–1.2)
BUN SERPL-MCNC: 7 MG/DL (ref 6–23)
CALCIUM SERPL-MCNC: 9 MG/DL (ref 8.6–10.3)
CHLORIDE SERPL-SCNC: 102 MMOL/L (ref 98–107)
CHOLEST SERPL-MCNC: 187 MG/DL (ref 0–199)
CHOLESTEROL/HDL RATIO: 2.9
CO2 SERPL-SCNC: 27 MMOL/L (ref 21–32)
CREAT SERPL-MCNC: 0.79 MG/DL (ref 0.5–1.05)
CREAT UR-MCNC: 127.1 MG/DL (ref 20–320)
EGFRCR SERPLBLD CKD-EPI 2021: >90 ML/MIN/1.73M*2
EOSINOPHIL # BLD AUTO: 0.13 X10*3/UL (ref 0–0.7)
EOSINOPHIL NFR BLD AUTO: 1.9 %
ERYTHROCYTE [DISTWIDTH] IN BLOOD BY AUTOMATED COUNT: 12.6 % (ref 11.5–14.5)
GLUCOSE SERPL-MCNC: 258 MG/DL (ref 74–99)
HCT VFR BLD AUTO: 39.2 % (ref 36–46)
HDLC SERPL-MCNC: 64.3 MG/DL
HGB BLD-MCNC: 12.8 G/DL (ref 12–16)
IMM GRANULOCYTES # BLD AUTO: 0.01 X10*3/UL (ref 0–0.7)
IMM GRANULOCYTES NFR BLD AUTO: 0.1 % (ref 0–0.9)
LDLC SERPL CALC-MCNC: 93 MG/DL
LYMPHOCYTES # BLD AUTO: 3.18 X10*3/UL (ref 1.2–4.8)
LYMPHOCYTES NFR BLD AUTO: 47.6 %
MCH RBC QN AUTO: 29.7 PG (ref 26–34)
MCHC RBC AUTO-ENTMCNC: 32.7 G/DL (ref 32–36)
MCV RBC AUTO: 91 FL (ref 80–100)
MICROALBUMIN UR-MCNC: 147.5 MG/L
MICROALBUMIN/CREAT UR: 116.1 UG/MG CREAT
MONOCYTES # BLD AUTO: 0.41 X10*3/UL (ref 0.1–1)
MONOCYTES NFR BLD AUTO: 6.1 %
NEUTROPHILS # BLD AUTO: 2.9 X10*3/UL (ref 1.2–7.7)
NEUTROPHILS NFR BLD AUTO: 43.6 %
NON HDL CHOLESTEROL: 123 MG/DL (ref 0–149)
NRBC BLD-RTO: 0 /100 WBCS (ref 0–0)
PLATELET # BLD AUTO: 259 X10*3/UL (ref 150–450)
POTASSIUM SERPL-SCNC: 3.4 MMOL/L (ref 3.5–5.3)
PROT SERPL-MCNC: 7 G/DL (ref 6.4–8.2)
RBC # BLD AUTO: 4.31 X10*6/UL (ref 4–5.2)
SODIUM SERPL-SCNC: 136 MMOL/L (ref 136–145)
TRIGL SERPL-MCNC: 147 MG/DL (ref 0–149)
TSH SERPL-ACNC: 2.02 MIU/L (ref 0.44–3.98)
VLDL: 29 MG/DL (ref 0–40)
WBC # BLD AUTO: 6.7 X10*3/UL (ref 4.4–11.3)

## 2024-04-29 PROCEDURE — 84443 ASSAY THYROID STIM HORMONE: CPT

## 2024-04-29 PROCEDURE — 83516 IMMUNOASSAY NONANTIBODY: CPT

## 2024-04-29 PROCEDURE — 86341 ISLET CELL ANTIBODY: CPT

## 2024-04-29 PROCEDURE — 82043 UR ALBUMIN QUANTITATIVE: CPT

## 2024-04-29 PROCEDURE — 86337 INSULIN ANTIBODIES: CPT

## 2024-04-29 PROCEDURE — 83036 HEMOGLOBIN GLYCOSYLATED A1C: CPT

## 2024-04-29 PROCEDURE — 83519 RIA NONANTIBODY: CPT

## 2024-04-29 PROCEDURE — 82010 KETONE BODYS QUAN: CPT

## 2024-04-29 PROCEDURE — 85025 COMPLETE CBC W/AUTO DIFF WBC: CPT

## 2024-04-29 PROCEDURE — 77063 BREAST TOMOSYNTHESIS BI: CPT | Performed by: RADIOLOGY

## 2024-04-29 PROCEDURE — 36415 COLL VENOUS BLD VENIPUNCTURE: CPT

## 2024-04-29 PROCEDURE — 80061 LIPID PANEL: CPT

## 2024-04-29 PROCEDURE — 77067 SCR MAMMO BI INCL CAD: CPT | Performed by: RADIOLOGY

## 2024-04-29 PROCEDURE — 82570 ASSAY OF URINE CREATININE: CPT

## 2024-04-29 PROCEDURE — 84681 ASSAY OF C-PEPTIDE: CPT

## 2024-04-29 PROCEDURE — 80053 COMPREHEN METABOLIC PANEL: CPT

## 2024-04-29 PROCEDURE — 77067 SCR MAMMO BI INCL CAD: CPT

## 2024-04-30 LAB — C PEPTIDE SERPL-MCNC: 1.7 NG/ML (ref 0.7–3.9)

## 2024-05-02 ENCOUNTER — TELEPHONE (OUTPATIENT)
Dept: PRIMARY CARE | Facility: CLINIC | Age: 39
End: 2024-05-02
Payer: COMMERCIAL

## 2024-05-02 LAB
EST. AVERAGE GLUCOSE BLD GHB EST-MCNC: 243 MG/DL
HBA1C MFR BLD: 10.1 %
ISLET CELL512 AB SER IA-ACNC: >120 U/ML (ref 0–7.4)

## 2024-05-03 ENCOUNTER — OFFICE VISIT (OUTPATIENT)
Dept: PRIMARY CARE | Facility: CLINIC | Age: 39
End: 2024-05-03
Payer: COMMERCIAL

## 2024-05-03 VITALS
WEIGHT: 137 LBS | HEART RATE: 64 BPM | SYSTOLIC BLOOD PRESSURE: 134 MMHG | DIASTOLIC BLOOD PRESSURE: 84 MMHG | BODY MASS INDEX: 24.27 KG/M2 | HEIGHT: 63 IN

## 2024-05-03 DIAGNOSIS — E05.00 GRAVES DISEASE: ICD-10-CM

## 2024-05-03 DIAGNOSIS — I10 PRIMARY HYPERTENSION: ICD-10-CM

## 2024-05-03 DIAGNOSIS — E10.29 TYPE 1 DIABETES MELLITUS WITH DIABETIC MICROALBUMINURIA (MULTI): Primary | ICD-10-CM

## 2024-05-03 DIAGNOSIS — F41.1 GENERALIZED ANXIETY DISORDER: ICD-10-CM

## 2024-05-03 DIAGNOSIS — R80.9 TYPE 1 DIABETES MELLITUS WITH DIABETIC MICROALBUMINURIA (MULTI): Primary | ICD-10-CM

## 2024-05-03 LAB
GLIADIN PEPTIDE IGA SER IA-ACNC: 8.6 U/ML
TTG IGA SER IA-ACNC: 5.4 U/ML

## 2024-05-03 PROCEDURE — 4004F PT TOBACCO SCREEN RCVD TLK: CPT | Performed by: INTERNAL MEDICINE

## 2024-05-03 PROCEDURE — 3075F SYST BP GE 130 - 139MM HG: CPT | Performed by: INTERNAL MEDICINE

## 2024-05-03 PROCEDURE — 99214 OFFICE O/P EST MOD 30 MIN: CPT | Performed by: INTERNAL MEDICINE

## 2024-05-03 PROCEDURE — 3046F HEMOGLOBIN A1C LEVEL >9.0%: CPT | Performed by: INTERNAL MEDICINE

## 2024-05-03 PROCEDURE — 3048F LDL-C <100 MG/DL: CPT | Performed by: INTERNAL MEDICINE

## 2024-05-03 PROCEDURE — 3060F POS MICROALBUMINURIA REV: CPT | Performed by: INTERNAL MEDICINE

## 2024-05-03 PROCEDURE — 3079F DIAST BP 80-89 MM HG: CPT | Performed by: INTERNAL MEDICINE

## 2024-05-03 RX ORDER — INSULIN GLARGINE 300 [IU]/ML
10 INJECTION, SOLUTION SUBCUTANEOUS NIGHTLY
Qty: 1 ML | Refills: 11 | Status: SHIPPED | OUTPATIENT
Start: 2024-05-03 | End: 2024-05-09

## 2024-05-03 RX ORDER — BLOOD-GLUCOSE SENSOR
EACH MISCELLANEOUS
Qty: 1 EACH | Refills: 11 | Status: SHIPPED | OUTPATIENT
Start: 2024-05-03 | End: 2024-06-04

## 2024-05-03 RX ORDER — PEN NEEDLE, DIABETIC 30 GX3/16"
1 NEEDLE, DISPOSABLE MISCELLANEOUS NIGHTLY
Qty: 30 EACH | Refills: 11 | Status: SHIPPED | OUTPATIENT
Start: 2024-05-03 | End: 2024-06-02

## 2024-05-03 ASSESSMENT — ENCOUNTER SYMPTOMS
NUMBNESS: 1
POLYPHAGIA: 1
POLYDIPSIA: 1
FREQUENCY: 1
DYSURIA: 1

## 2024-05-03 NOTE — ASSESSMENT & PLAN NOTE
Autoimmune antibody positive suggesting type 1 diabetes mellitus presentation.  Consultation with endocrinology for further evaluation to distinguish prognosis of type I versus type II autoimmune history with Graves' disease suggested of type I DM and will start insulin with Toujeo insulin 10 units nightly prescribed CGM monitor with freestyle rafael 3 patient has to monitor blood sugars over weekend and call with readings on Monday to check 4 times a day with meals and at bedtime regular carefully reduce blood sugar to less than 200 but greater than 80 long-acting insulin initiation for basal level control consider initiation of basal bolus depending on response referral made to pharmacologist reevaluate in 4 weeks

## 2024-05-03 NOTE — PROGRESS NOTES
"Subjective   Reason for Visit: Arlene Moore is an 39 y.o. female here for a new onset diabetes visit.          Reviewed all medications by prescribing practitioner or clinical pharmacist (such as prescriptions, OTCs, herbal therapies and supplements) and documented in the medical record.    3-year-old female having history of vasomotor symptoms history of hypertension had routine blood work recently completed revealing nonfasting blood sugar of 258 and hemoglobin A1c of 10.1 consistent with diabetes mellitus autoimmune lab testing suggested type 1 diabetes with autoantibodies positive.  Family history mother with diabetes mellitus father with impaired fasting sugars and Graves' disease patient with history of Graves' disease in past quiescent at this time suggesting autoimmune cause she states in retrospect she has been having symptoms for the past 6 months and include polydipsia polyphagia polyuria difficulty with heat and cold intolerance vasomotor symptoms also noting paresthesias of hands and feet recently with evidence of Raynaud's phenomenon low potassium mildly low at 3.4 no evidence of ketoacidosis with lab work patient denies nausea vomiting abdominal pain eats 1 time a day no previous history of impaired fasting sugars or glucose intolerance        Patient Care Team:  Jason Luna DO as PCP - General  Jason Luna DO as PCP - [facility] PCP     Review of Systems   Endocrine: Positive for cold intolerance, heat intolerance, polydipsia, polyphagia and polyuria.   Genitourinary:  Positive for dysuria, frequency and urgency.   Neurological:  Positive for numbness.       Objective   Vitals:  /84 (BP Location: Left arm, Patient Position: Sitting)   Pulse 64   Ht 1.6 m (5' 3\")   Wt 62.1 kg (137 lb)   BMI 24.27 kg/m²       Physical Exam  Vitals and nursing note reviewed.   Constitutional:       General: She is not in acute distress.     Appearance: Normal appearance. She is well-developed and " normal weight. She is not toxic-appearing.   HENT:      Head: Normocephalic and atraumatic.      Right Ear: Tympanic membrane and external ear normal.      Left Ear: Tympanic membrane and external ear normal.      Nose: Nose normal.      Mouth/Throat:      Mouth: Mucous membranes are moist.      Pharynx: Oropharynx is clear. No oropharyngeal exudate or posterior oropharyngeal erythema.      Tonsils: No tonsillar exudate. 2+ on the right. 2+ on the left.   Eyes:      Extraocular Movements: Extraocular movements intact.      Conjunctiva/sclera: Conjunctivae normal.   Cardiovascular:      Rate and Rhythm: Normal rate and regular rhythm.      Pulses: Normal pulses.      Heart sounds: Normal heart sounds. No murmur heard.  Pulmonary:      Effort: Pulmonary effort is normal.      Breath sounds: Normal breath sounds.   Abdominal:      General: Abdomen is flat. Bowel sounds are normal.      Palpations: Abdomen is soft.   Musculoskeletal:      Cervical back: Neck supple.   Lymphadenopathy:      Cervical: No cervical adenopathy.   Skin:     General: Skin is warm and dry.      Findings: No rash.   Neurological:      Mental Status: She is alert. Mental status is at baseline.   Psychiatric:         Mood and Affect: Mood normal.         Behavior: Behavior normal.         Thought Content: Thought content normal.         Judgment: Judgment normal.         Assessment/Plan   Problem List Items Addressed This Visit    None

## 2024-05-03 NOTE — ASSESSMENT & PLAN NOTE
Continue with amlodipine 5 mg daily clonidine patch for now patient with microalbuminuria optimize blood sugar first and then consider ACE inhibitor versus ACE receptor blocker and treatment of diabetes with microalbuminuria avoid SGLT2 inhibitor therapy at this time as it raises risk for DKA

## 2024-05-03 NOTE — ASSESSMENT & PLAN NOTE
In remission at this time thyroid function stable continue to monitor patient with history of Graves' hyperthyroidism in the past treated with PTU

## 2024-05-03 NOTE — PROGRESS NOTES
"Subjective   Patient ID: Arlene Moore is a 39 y.o. female who presents for Results (Patient states she did not fast ).    HPI     Review of Systems    Objective   /84 (BP Location: Left arm, Patient Position: Sitting)   Pulse 64   Ht 1.6 m (5' 3\")   Wt 62.1 kg (137 lb)   BMI 24.27 kg/m²     Physical Exam    Assessment/Plan          "

## 2024-05-04 LAB — ZNT8 AB SERPL IA-ACNC: >500 U/ML (ref 0–15)

## 2024-05-05 LAB — GAD65 AB SER IA-ACNC: >250 IU/ML (ref 0–5)

## 2024-05-07 ENCOUNTER — PATIENT MESSAGE (OUTPATIENT)
Dept: PRIMARY CARE | Facility: CLINIC | Age: 39
End: 2024-05-07
Payer: COMMERCIAL

## 2024-05-07 DIAGNOSIS — E10.29 TYPE 1 DIABETES MELLITUS WITH DIABETIC MICROALBUMINURIA (MULTI): Primary | ICD-10-CM

## 2024-05-07 DIAGNOSIS — R80.9 TYPE 1 DIABETES MELLITUS WITH DIABETIC MICROALBUMINURIA (MULTI): Primary | ICD-10-CM

## 2024-05-09 ENCOUNTER — TELEPHONE (OUTPATIENT)
Dept: PRIMARY CARE | Facility: CLINIC | Age: 39
End: 2024-05-09
Payer: COMMERCIAL

## 2024-05-09 DIAGNOSIS — R80.9 TYPE 1 DIABETES MELLITUS WITH ALBUMINURIA (MULTI): Primary | ICD-10-CM

## 2024-05-09 DIAGNOSIS — R80.9 TYPE 1 DIABETES MELLITUS WITH DIABETIC MICROALBUMINURIA (MULTI): ICD-10-CM

## 2024-05-09 DIAGNOSIS — E10.29 TYPE 1 DIABETES MELLITUS WITH ALBUMINURIA (MULTI): Primary | ICD-10-CM

## 2024-05-09 DIAGNOSIS — E10.29 TYPE 1 DIABETES MELLITUS WITH DIABETIC MICROALBUMINURIA (MULTI): ICD-10-CM

## 2024-05-09 RX ORDER — INSULIN GLARGINE 300 [IU]/ML
30 INJECTION, SOLUTION SUBCUTANEOUS NIGHTLY
Qty: 3 ML | Refills: 11 | Status: SHIPPED | OUTPATIENT
Start: 2024-05-09 | End: 2025-05-09

## 2024-05-09 RX ORDER — INSULIN LISPRO 100 [IU]/ML
5 INJECTION, SOLUTION INTRAVENOUS; SUBCUTANEOUS
Qty: 13.5 ML | Refills: 3 | Status: SHIPPED | OUTPATIENT
Start: 2024-05-09 | End: 2025-05-09

## 2024-05-09 NOTE — TELEPHONE ENCOUNTER
I spoke with the scheduling department for Dr. Shore, this provider has a full patient load and is no longer accepting new patients.   Would you like me to reach out to another Endocrinologist?

## 2024-05-09 NOTE — TELEPHONE ENCOUNTER
I spoke with Dr. Jaffe they have her scheduled for an appointment 10/24 @ 2:45pm and put her on a wait list, but also recommended that the patient call back every other day or so to see if there were cancellations and they can usually get them in that way. I left her on the schedule there. In the meantime I have a voicemail in with Dr. Keith, Dr. Andrews, and Dr. Weaver at [facility] in [place] to see if they are able to work the patient in any sooner. It looks like they partner with  so hopefully we will be able to see their notes.    I also checked into Diabetic Education for the patient while we wait to hear back from [facility]. They said we have Diabetic Education currently in [facility] and they do offer Virtual options as well. Should we get this started for the patient as well?

## 2024-05-10 NOTE — TELEPHONE ENCOUNTER
I just spoke with Endocrinology in [place] and they are going to have the Physician review the chart and determine when the patient can be seen. They will contact the patient with a date and time. Patient notified.  Faxed referral, last office visit note, and lab results to [place] [facility] at [phone].

## 2024-05-10 NOTE — TELEPHONE ENCOUNTER
Patient is set up with Diabetic Education Virtually on Monday at 8am.  She was placed on Dr. Jaffe wait list, patient was also given their phone number to call in 2-3 times per week and check to see if they had cancellations.  I also still have a message out to schedule with one of the Endocrinologists in [place] if they are able to see her soon.

## 2024-05-10 NOTE — PROGRESS NOTES
Reason for Visit:  Arlene Moore is a 39 y.o. female who presents for Initial DSME Visit    DSME - Global Assessment    Marital Status: .  Support Person: Mom    What do you hope to gain from this diabetes education visit? New type 1 DM diagnosis, some kind of information about this. I don't know what to eat, when to eat. I know nothing    Have you had diabetes education in the past?  No.  Readiness to Learn: demonstrates willingness to learn and demonstrates ability to learn  Preferred learning method: all of the above. Doing last is best.    Household Composition: living independently, with family    Demographics:   Difficulties with: None  Highest Level of Education: Post-Graduate Degree  Race/Ethnic Origin: White/  Occupation:     Work hours: 8-4    Health Status:  Smoking/Tobacco Use: No, patient does not smoke or use tobacco.  Alcohol Use: Yes, patient drinks alcohol.    Type of Diabetes: Type 1  What year were you diagnosed with diabetes: this month  Family History: very rare    Patient Active Problem List    Diagnosis Date Noted    Type 1 diabetes mellitus with diabetic microalbuminuria (Multi) 05/03/2024    Vasomotor phenomenon 04/19/2024    Screening mammogram for breast cancer 04/19/2024    Well woman exam 01/28/2024    Recurrent UTI (urinary tract infection) 11/13/2023    Panic disorder (episodic paroxysmal anxiety) 11/13/2023    Generalized anxiety disorder 04/13/2023    Graves disease 04/13/2023    Primary hypertension 04/13/2023        Lab Results   Component Value Date    HGBA1C 10.1 (H) 04/29/2024     Diabetes Self-Management Skills and Behaviors:   Do you exercise regularly?: No.Planned -no, but I get plenty of it. My son is five, he's huge and need to carry him a lot due to autism and Oppositional defiance disorder.     Yes  How do you manage your diabetes when you are sick?: unsure    Diabetes Medications: insulin pens  Current Outpatient Medications  "  Medication Sig Dispense Refill    amLODIPine (Norvasc) 5 mg tablet Take 1 tablet (5 mg) by mouth once daily.      cloNIDine (Catapres-TTS-1) 0.1 mg/24 hr patch Apply one patch on the skin and replace every 7 days, as directed 4 patch 11    FreeStyle Eleni 3 Sensor device [place] as directed for type 1 diabetes 1 each 11    insulin glargine (Toujeo Max U-300 SoloStar) 300 unit/mL (3 mL) injection Inject 30 Units under the skin once daily at bedtime. Take as directed per insulin instructions. 3 mL 11    insulin lispro (HumaLOG KwikPen Insulin) 100 unit/mL injection Inject 5 Units under the skin 3 times a day with meals. Take as directed per insulin instructions. 13.5 mL 3    pen needle, diabetic (BD Ultra-Fine Short Pen Needle) 31 gauge x 5/16\" needle 1 each once daily at bedtime. 30 each 11    venlafaxine XR (Effexor-XR) 150 mg 24 hr capsule Take 1 capsule (150 mg) by mouth 2 times a day before meals. Do not crush or chew. 180 capsule 3     No current facility-administered medications for this visit.     DM medications as patient is taking them:    Taking as highlighted above    Four days Toujeo increased from 20 to 30 units.   Same day Humalog was started.    Injection/Infusion Sites: abdominal wall. Right under belly button     Monitorng: CGM: Eleni 3        Discussed performing fingerstick when sensor reading doesn't match symptoms or expectations, when levels are changing quickly, during warm up period with new sensor and when sensor provides no blood sugar reading.     Acute Complications-Safety: 2 fruit roll ups and protein bars     Hypoglycemia: A lot. Before lunchtime and before dinner. Thinks she's not eating enough food. I didn't usually eat breakfast or lunch in past - I'm trying. Do I need to take the insulin before I eat. I don't always have time to eat breakfast. Sometimes I take insulin before I eat, thinking I will eat then issues.       Hypoglycemia Treatment:     Hyperglycemia: Goes really high in " middle of night. I eat dinner around 7 pm and in bed around 830 or 9 pm.  Dinner is heavier meal of the day.     Hyperglycemia Treatment: none    DSME - Meal Planning and Diet Recall  Are you currently following any meal plan: No Plan.      DSME - Goals and Recommendations    [facility] Diabetes Education Program SMART Behavior Goal Setting:        S - Specific: Exactly what do you want to do        M - Measurable: Use a calendar or chart to track progress        A - Attainable: Take small steps to make bigger changes        R - Realistic: Pick something reasonable that you know you can do        T - Time Oriented: Choose a time limit (No longer than 6 months)    Specific Goal:   I will not skip meals    2. I will note what my CGM says blood sugar is before bed and at night if it alarms high.      3. I will carry fast acting sugar source at all times and know how to follow-up on the treatment (recheck level in 15 min., if still below 70 take more fast acting sugar, if above 70 eat snack with carb and protein or meal if meal-time.    Measurable: How will I track my goal:  I will keep track of my progress daily by  other .    Time Oriented: two weeks.    Topics Covered and Impression:    DSME Topics Covered During Visit:   Understanding Diabetes Basics  Reviewing Medication Classes  Taking Medications as Prescribed  Being Physically Active  Review Glycemic Goals (CGM or SMBG)  Reviewed Hypoglycemia Signs/Symptoms/Tx Plan  Healthy Meal Plan  MyPlate Method  Site selection/rotation (did not know where all to rotate within abdomen. Injected in same areas)  When finger sticks are necessary while wearing CGM  Timing of meal time insulin in relation to meals  Prepping meals ahead. Not skipping meals.   Basic carb counting, reading labels & exchange lists.     DSME Topics for Follow-Up:   A1c Review  Taking Medications as Prescribed  Being Physically Active  Using a Blood Sugar Monitor  Review Glycemic Goals (CGM  "or SMBG)  Reviewed Hypoglycemia Signs/Symptoms/Tx Plan  Reviewed Hyperglycemia Signs/Symptoms/Tx Plan  Healthy Meal Plan  Sick Day Rules  Ketone Monitoring    Materials provided during today's visit: Will put in the mail for patient  DM patient guide, CGM-Maikel Nordisk Clinical Targets for Data Interpretation, 2 handouts on exercise, blood glucose log sheet, A1C handout with patient's level written along with target level, hypo/hyperglycemia signs/symptoms & treatment for hypoglycemia.  [employer] healthy meal planning booklet.       Provider Impression: Patient eager to learn stating \"she knows nothing\" about T1DM. She's noticed lows occurring frequently and before lunch and dinner. Admits she typically didn't eat breakfast or lunch, but \"is trying.\" Dangers of low blood sugar and what can be done to prevent them discussed. Encouraged her to still 3 meals per day even if not hungry. Encouraged meal prep ahead and she has started to do this. Discussed meal planning, carb counting and to aim for 45-60 grams of carbohydrate per meal. During visit she had low per Eleni 3 sensor = 62.  Said she hadn't eaten yet. She was unsure if she is supposed to give Humalog before eating and how soon to eat. Patient ate fruit roll up during visit. By end of visit level was 53. Encouraged her to 2 fruit roll-ups and we discussed she would recheck level in 15 min. If still below 70 take more fast acting sugar, if above 70 eat breakfast or a snack. She was able to teach back prevention and treatment of hypoglycemia. Advised her to inject Humalog 15-20 min before eating if possible. Some mornings \"all hell breaks\" and son may need some unexpected help and she might forget to eat. Advised to make sure food is in front of her and importance that she must try to eat otherwise she could experience serious health consequences from lows due to rapid acting insulin with no food. She agreed to make eating her meals a priority. She's having " highs overnight. She does eat her largest meal - dinner around 7 and is in bed around 830 or 900. She might need her dinner Humalog dose increased. She will report her blood sugars to MD today and ask for glucometer order as we discussed. Will follow-up in 2 weeks. Unable to complete all global assessment questions. The referring provider is within the same EMR and is able to see the DSMES provided and the outcomes.       Time: I personally spent a total of 60 minutes with the patient providing diabetes self-management education. Visit documentation will be sent electronically to referring provider.

## 2024-05-12 LAB — INSULIN AB SER IA-ACNC: <0.4 U/ML (ref 0–0.4)

## 2024-05-13 ENCOUNTER — NUTRITION (OUTPATIENT)
Dept: NUTRITION | Facility: CLINIC | Age: 39
End: 2024-05-13
Payer: COMMERCIAL

## 2024-05-13 DIAGNOSIS — E10.29 TYPE 1 DIABETES MELLITUS WITH DIABETIC MICROALBUMINURIA (MULTI): ICD-10-CM

## 2024-05-13 DIAGNOSIS — R80.9 TYPE 1 DIABETES MELLITUS WITH DIABETIC MICROALBUMINURIA (MULTI): ICD-10-CM

## 2024-05-13 PROCEDURE — G0108 DIAB MANAGE TRN  PER INDIV: HCPCS | Mod: 95 | Performed by: PHYSICAL MEDICINE & REHABILITATION

## 2024-05-17 ENCOUNTER — TELEMEDICINE (OUTPATIENT)
Dept: PHARMACY | Facility: HOSPITAL | Age: 39
End: 2024-05-17
Payer: COMMERCIAL

## 2024-05-17 DIAGNOSIS — R80.9 TYPE 1 DIABETES MELLITUS WITH DIABETIC MICROALBUMINURIA (MULTI): ICD-10-CM

## 2024-05-17 DIAGNOSIS — E10.29 TYPE 1 DIABETES MELLITUS WITH DIABETIC MICROALBUMINURIA (MULTI): ICD-10-CM

## 2024-05-17 RX ORDER — LANCETS
EACH MISCELLANEOUS
Qty: 200 EACH | Refills: 2 | Status: SHIPPED | OUTPATIENT
Start: 2024-05-17

## 2024-05-17 RX ORDER — IBUPROFEN 200 MG
TABLET ORAL
Qty: 50 TABLET | Refills: 1 | Status: SHIPPED | OUTPATIENT
Start: 2024-05-17

## 2024-05-17 RX ORDER — IBUPROFEN 200 MG
CAPSULE ORAL
Qty: 200 EACH | Refills: 2 | Status: SHIPPED | OUTPATIENT
Start: 2024-05-17

## 2024-05-17 RX ORDER — ISOPROPYL ALCOHOL 70 ML/100ML
SWAB TOPICAL
Qty: 200 EACH | Refills: 2 | Status: SHIPPED | OUTPATIENT
Start: 2024-05-17

## 2024-05-17 RX ORDER — DEXTROSE 4 G
TABLET,CHEWABLE ORAL
Qty: 1 EACH | Refills: 0 | Status: SHIPPED | OUTPATIENT
Start: 2024-05-17

## 2024-05-17 NOTE — PROGRESS NOTES
Outpatient Clinical Pharmacy Visit  Arlene Moore is a 39 y.o. female who was referred to the ambulatory Clinical Pharmacy Team for their Diabetes.    Referring Provider: DO Bennie Costello   No Known Allergies    Preferred Pharmacy    [facility]/pharmacy #[id number] [place], [place] - [address] [place] [address] AT CORNER OF [address] [address]  Phone: [phone] Fax: [phone]    DIABETES ASSESSMENT     Current Diabetes Medication Regimen    Insulin Toujeo Max 300 unit/mL, 20 units at bedtime   Humalog 100 unit/mL  5 units breakfast  2 units lunch  5 units dinner    Secondary Prevention  Statin? No  ACE-I/ARB? No  Aspirin? No    Pertinent PMH Review:  PMH of Pancreatitis: No  PMH of Retinopathy: No  PMH/FH of Medullary Thyroid Carcinoma or Multiple Endocrine Neoplasia Type 2: No  PMH of Urinary Tract Infections: No    Health Maintenance: Not discussed  Foot Exam:   Eye Exam:   Lipid Panel: LDL 93 mg/dL and triglycerides 147 mg/dL  as of 24  Urine Albumin: 147.5 mg/L as of 24    DIET: Working on with Diabetes Education  EXERCISE: Working on with Diabetes Education    Current monitoring regimen:   Patient is using: continuous glucose monitor, Eleni 3    Patient-Reported Blood Glucose:  Patient notes AM FPG ranging 80s-200s  Patient has not had any overnight lows since adjustments made on Tuesday by [facility] Endocrinology    Diabetes Patient Education    PATIENT GOALS   Fasting B - 130 mg/dL 2-HR Postprandial BG:   Less than 180 mg/dL A1c:   Less than 6.5 % or 7.0 %     Rule of 15: eating ~15 g of carbs when BG less than 80 (chocolate, half cup juice, 3-4 glucose tabs).  Recognize symptoms of high and low blood sugar.   Eat a realistic healthy diet consisting of fruits, vegetables, fiber, protein food choices on a regular basis and be aware of portion/serving sizes. Reduce carbohydrate consumption and always consume with protein and fat. Avoid foods high in  saturated/trans fat, high salt content, and sweets and beverages with added sugars.  Limit alcohol consumption; alcohol may affect your blood sugar and cause hypoglycemia.   If not at a healthy weight, stay active and incorporate ~30 mins of exercise into your daily routine to manage your weight and increase the body's acceptance of insulin.      Laboratory Results  Lab Results   Component Value Date    BILITOT 0.4 04/29/2024    CALCIUM 9.0 04/29/2024    CO2 27 04/29/2024     04/29/2024    CREATININE 0.79 04/29/2024    GLUCOSE 258 (H) 04/29/2024    ALKPHOS 76 04/29/2024    K 3.4 (L) 04/29/2024    PROT 7.0 04/29/2024     04/29/2024    AST 18 04/29/2024    ALT 26 04/29/2024    BUN 7 04/29/2024    ANIONGAP 10 04/29/2024    ALBUMIN 4.4 04/29/2024    GFRF >90 12/05/2022    EGFR >90 04/29/2024     Lab Results   Component Value Date    TRIG 147 04/29/2024    CHOL 187 04/29/2024    HDL 64.3 04/29/2024     Lab Results   Component Value Date    HGBA1C 10.1 (H) 04/29/2024       Assessment/Plan   Problem List Items Addressed This Visit       Type 1 diabetes mellitus with diabetic microalbuminuria (Multi)    Relevant Orders    Follow Up In Advanced Primary Care - Pharmacy     General Patient Discussion  Patient has recently (within the past 1 month) been diagnosed with autoimmune-related Type 1 Diabetes.   Biggest kody has been getting established with endocrinology--patient had an appointment this past Tuesday 5/14/24 with [facility] Endocrinology!!! (Dr. Williams Andrews)  Toujeo Max reduced to 20 units nightly  Humalog reduced to 5-2-5 units daily  Endocrinology would like to pursue an insulin pump for patient  As patient is now being managed by  Endocrinology, no dose adjustments made to patient's insulin regimen. Patient encouraged to contact Dr. Andrews's office with any questions/concerns related to insulin dosing or BG values. Instead, focused on general pharmacy counseling/topics:  BG values/goals and  FPG v. PPG  New needle with each insulin injection  Prime Humalog with 2 units of insulin with each new needle  Prime Toujeo Max with 4 units of insulin with each new needle  CalorieKing mobile marco to assist with carbohydrate counting (if necessary)  Accuracy/time-to-result of CGM v. Fingerstick testing  Rule of 15  How to use a glucometer- patient to receive tips/tricks from nurse friends, as next Diabetes Education visit not for ~2 weeks  Patient does not have a glucometer- will send  Patient does not have emergent glucose therapy- will send    Plan/Changes   Continue all meds under the continuation of care with the referring provider and clinical pharmacy team  Specialists: Patient currently sees outpatient endocrinology.  Start testing supplies: glucometer, strips, lancets, alcohol swabs, send to local pharmacy  Start glucose tablets, send to local pharmacy    Next PCP Follow-Up  6/4/24    Next Clinical Pharmacy Follow-Up  2 weeks with Manuel GLASGOW   General check-in  Ensure obtained glucometer  Address any other pharmacy concerns      Sarah Shah, PharmD     Verbal consent to manage patient's drug therapy was obtained from the patient. They were informed they may decline to participate or withdraw from participation in pharmacy services at any time.

## 2024-05-17 NOTE — Clinical Note
Dr. Luna and Jenna: Fantastic news- patient was able to get in and see [facility] endocrinology (Dr. Williams Andrews) this past Tuesday 5/14/24! As such, did not make any medication adjustments at this visit, but discussed general pharmacy education. Ordered meter and glucose tabs. Sounds like endo would like to pursue an insulin pump for this patient. Just an FYI, thanks!

## 2024-05-31 ENCOUNTER — APPOINTMENT (OUTPATIENT)
Dept: PHARMACY | Facility: HOSPITAL | Age: 39
End: 2024-05-31
Payer: COMMERCIAL

## 2024-05-31 ENCOUNTER — APPOINTMENT (OUTPATIENT)
Dept: NUTRITION | Facility: CLINIC | Age: 39
End: 2024-05-31
Payer: COMMERCIAL

## 2024-06-04 ENCOUNTER — OFFICE VISIT (OUTPATIENT)
Dept: PRIMARY CARE | Facility: CLINIC | Age: 39
End: 2024-06-04
Payer: COMMERCIAL

## 2024-06-04 VITALS
SYSTOLIC BLOOD PRESSURE: 126 MMHG | DIASTOLIC BLOOD PRESSURE: 90 MMHG | WEIGHT: 138 LBS | HEART RATE: 72 BPM | HEIGHT: 63 IN | BODY MASS INDEX: 24.45 KG/M2

## 2024-06-04 DIAGNOSIS — R80.9 TYPE 1 DIABETES MELLITUS WITH DIABETIC MICROALBUMINURIA (MULTI): Primary | ICD-10-CM

## 2024-06-04 DIAGNOSIS — I10 PRIMARY HYPERTENSION: ICD-10-CM

## 2024-06-04 DIAGNOSIS — F41.1 GENERALIZED ANXIETY DISORDER: ICD-10-CM

## 2024-06-04 DIAGNOSIS — E10.29 TYPE 1 DIABETES MELLITUS WITH DIABETIC MICROALBUMINURIA (MULTI): Primary | ICD-10-CM

## 2024-06-04 DIAGNOSIS — E05.00 GRAVES DISEASE: ICD-10-CM

## 2024-06-04 PROCEDURE — 3060F POS MICROALBUMINURIA REV: CPT | Performed by: INTERNAL MEDICINE

## 2024-06-04 PROCEDURE — 3080F DIAST BP >= 90 MM HG: CPT | Performed by: INTERNAL MEDICINE

## 2024-06-04 PROCEDURE — 99214 OFFICE O/P EST MOD 30 MIN: CPT | Performed by: INTERNAL MEDICINE

## 2024-06-04 PROCEDURE — 4010F ACE/ARB THERAPY RXD/TAKEN: CPT | Performed by: INTERNAL MEDICINE

## 2024-06-04 PROCEDURE — 3046F HEMOGLOBIN A1C LEVEL >9.0%: CPT | Performed by: INTERNAL MEDICINE

## 2024-06-04 PROCEDURE — 3074F SYST BP LT 130 MM HG: CPT | Performed by: INTERNAL MEDICINE

## 2024-06-04 PROCEDURE — 4004F PT TOBACCO SCREEN RCVD TLK: CPT | Performed by: INTERNAL MEDICINE

## 2024-06-04 PROCEDURE — 3048F LDL-C <100 MG/DL: CPT | Performed by: INTERNAL MEDICINE

## 2024-06-04 RX ORDER — LISINOPRIL 5 MG/1
5 TABLET ORAL DAILY
Qty: 100 TABLET | Refills: 3 | Status: SHIPPED | OUTPATIENT
Start: 2024-06-04 | End: 2025-07-09

## 2024-06-04 RX ORDER — BLOOD-GLUCOSE SENSOR
EACH MISCELLANEOUS
Qty: 1 EACH | Refills: 11 | Status: SHIPPED | OUTPATIENT
Start: 2024-06-04

## 2024-06-04 RX ORDER — DEXTROSE 4 G
TABLET,CHEWABLE ORAL
Qty: 1 EACH | Refills: 0 | Status: SHIPPED | OUTPATIENT
Start: 2024-06-04

## 2024-06-04 ASSESSMENT — ENCOUNTER SYMPTOMS
DIZZINESS: 1
HUNGER: 1
NERVOUS/ANXIOUS: 1
SWEATS: 1

## 2024-06-04 NOTE — ASSESSMENT & PLAN NOTE
Start lisinopril 5 mg daily reevaluate next visit.  Patient doing okay on basal bolus insulin reported early morning hypoglycemia given instruction on reducing sliding scale insulin continues on 20 units nightly of Toujeo insulin but in transition with endocrine specialist to transition to insulin pump Freestyle rafael sensors not working and not covered transition to Dexcom 6 sensor with use of insulin pump sees new endocrinologist later this month requires improved coordinated care to get insulin pump and to get resources to optimize therapy as soon as possible reevaluate in 3 months

## 2024-06-04 NOTE — PROGRESS NOTES
Subjective   Reason for Visit: Arlene Moore is an 39 y.o. female here for a follow-up new onset diabetes mellitus type 1 visit.     Past Medical, Surgical, and Family History reviewed and updated in chart.    Reviewed all medications by prescribing practitioner or clinical pharmacist (such as prescriptions, OTCs, herbal therapies and supplements) and documented in the medical record.    Diabetes  She presents for her follow-up diabetic visit. She has type 1 diabetes mellitus. No MedicAlert identification noted. The initial diagnosis of diabetes was made 1 month ago. Her disease course has been improving. Hypoglycemia symptoms include dizziness, hunger, nervousness/anxiousness and sweats. There are no hypoglycemic complications. Symptoms are improving. Risk factors for coronary artery disease include diabetes mellitus, family history and hypertension. Current diabetic treatment includes intensive insulin program and insulin pump. She is compliant with treatment all of the time. She is following a diabetic and generally healthy diet. Meal planning includes ADA exchanges, avoidance of concentrated sweets and carbohydrate counting. She has had a previous visit with a dietitian. She participates in exercise daily. Her breakfast blood glucose is taken between 9-10 am. Her breakfast blood glucose range is generally 180-200 mg/dl. Her lunch blood glucose is taken between 2-3 pm. Her lunch blood glucose range is generally 70-90 mg/dl. Her dinner blood glucose is taken between 7-8 pm. Her dinner blood glucose range is generally  mg/dl. Her bedtime blood glucose is taken after 11 pm. Her bedtime blood glucose range is generally  mg/dl. Her overall blood glucose range is 110-130 mg/dl. An ACE inhibitor/angiotensin II receptor blocker is being taken. She does not see a podiatrist.Eye exam is current.       Patient Care Team:  Jason Luna DO as PCP - General  Jason Luna DO as PCP - [facility] PCP  "    Review of Systems   Neurological:  Positive for dizziness.   Psychiatric/Behavioral:  The patient is nervous/anxious.    All other systems reviewed and are negative.      Objective   Vitals:  /90 (BP Location: Left arm)   Pulse 72   Ht 1.6 m (5' 3\")   Wt 62.6 kg (138 lb)   BMI 24.45 kg/m²       Physical Exam  Vitals and nursing note reviewed.   Constitutional:       General: She is not in acute distress.     Appearance: Normal appearance. She is well-developed. She is not toxic-appearing.   HENT:      Head: Normocephalic and atraumatic.      Right Ear: Tympanic membrane and external ear normal.      Left Ear: Tympanic membrane and external ear normal.      Nose: Nose normal.      Mouth/Throat:      Mouth: Mucous membranes are moist.      Pharynx: Oropharynx is clear. No oropharyngeal exudate or posterior oropharyngeal erythema.      Tonsils: No tonsillar exudate. 2+ on the right. 2+ on the left.   Eyes:      Extraocular Movements: Extraocular movements intact.      Conjunctiva/sclera: Conjunctivae normal.   Cardiovascular:      Rate and Rhythm: Normal rate and regular rhythm.      Pulses: Normal pulses.      Heart sounds: Normal heart sounds. No murmur heard.  Pulmonary:      Effort: Pulmonary effort is normal.      Breath sounds: Normal breath sounds.   Abdominal:      General: Abdomen is flat. Bowel sounds are normal.      Palpations: Abdomen is soft.   Musculoskeletal:      Cervical back: Neck supple.   Lymphadenopathy:      Cervical: No cervical adenopathy.   Skin:     General: Skin is warm and dry.      Findings: No rash.   Neurological:      Mental Status: She is alert. Mental status is at baseline.   Psychiatric:         Mood and Affect: Mood normal.         Behavior: Behavior normal.         Thought Content: Thought content normal.         Judgment: Judgment normal.         Assessment/Plan   Problem List Items Addressed This Visit       Generalized anxiety disorder    Current Assessment & Plan "     Stable on venlafaxine 300 mg daily         Graves disease    Primary hypertension    Current Assessment & Plan     Continue amlodipine 5 mg daily start lisinopril 5 mg nightly for mild microalbuminuria reevaluate with next visit         Type 1 diabetes mellitus with diabetic microalbuminuria (Multi) - Primary    Current Assessment & Plan     Start lisinopril 5 mg daily reevaluate next visit.  Patient doing okay on basal bolus insulin reported early morning hypoglycemia given instruction on reducing sliding scale insulin continues on 20 units nightly of Toujeo insulin but in transition with endocrine specialist to transition to insulin pump Freestyle rafael sensors not working and not covered transition to Dexcom 6 sensor with use of insulin pump sees new endocrinologist later this month requires improved coordinated care to get insulin pump and to get resources to optimize therapy as soon as possible reevaluate in 3 months         Relevant Medications    Dexcom G6 Sensor device    blood-glucose meter misc    lisinopril 5 mg tablet    Other Relevant Orders    Comprehensive Metabolic Panel    Hemoglobin A1C    Lipid Panel    Albumin , Urine Random    Follow Up In Advanced Primary Care - PCP - Established

## 2024-06-04 NOTE — ASSESSMENT & PLAN NOTE
Continue amlodipine 5 mg daily start lisinopril 5 mg nightly for mild microalbuminuria reevaluate with next visit

## 2024-06-04 NOTE — PROGRESS NOTES
"Subjective   Patient ID: Arlene Moore is a 39 y.o. female who presents for Follow-up and Diabetes.    HPI     Review of Systems    Objective   /90 (BP Location: Left arm)   Pulse 72   Ht 1.6 m (5' 3\")   Wt 62.6 kg (138 lb)   BMI 24.45 kg/m²     Physical Exam    Assessment/Plan          "

## 2024-06-11 ENCOUNTER — APPOINTMENT (OUTPATIENT)
Dept: PHARMACY | Facility: HOSPITAL | Age: 39
End: 2024-06-11
Payer: COMMERCIAL

## 2024-06-18 ENCOUNTER — TELEMEDICINE (OUTPATIENT)
Dept: PHARMACY | Facility: HOSPITAL | Age: 39
End: 2024-06-18
Payer: COMMERCIAL

## 2024-06-18 ENCOUNTER — TELEPHONE (OUTPATIENT)
Dept: PRIMARY CARE | Facility: CLINIC | Age: 39
End: 2024-06-18

## 2024-06-18 DIAGNOSIS — E10.29 TYPE 1 DIABETES MELLITUS WITH DIABETIC MICROALBUMINURIA (MULTI): ICD-10-CM

## 2024-06-18 DIAGNOSIS — R80.9 TYPE 1 DIABETES MELLITUS WITH DIABETIC MICROALBUMINURIA (MULTI): ICD-10-CM

## 2024-06-18 RX ORDER — PEN NEEDLE, DIABETIC 30 GX3/16"
NEEDLE, DISPOSABLE MISCELLANEOUS
Qty: 200 EACH | Refills: 11 | Status: SHIPPED | OUTPATIENT
Start: 2024-06-18

## 2024-06-18 NOTE — TELEPHONE ENCOUNTER
Patient had a Virtual Visit with Manuel, he sent in some refills for her while we wait to get her on the pump.  I spoke with the patient she is feeling much better about everything after speaking to Manuel.  She will  the supplies he ordered and wait for her Dexcom meter to come in. She has no further questions or concerns at this time.

## 2024-06-18 NOTE — PROGRESS NOTES
Outpatient Clinical Pharmacy Visit  Arlene Moore is a 39 y.o. female who was referred to the ambulatory Clinical Pharmacy Team for their No chief complaint on file..    Referring Provider: DO Bennie Costello   No Known Allergies    Preferred Pharmacy    [id number] [place], [place] - [address] [place] [address] AT CORNER OF [address] [address]  Phone: [phone] Fax: [phone]    DIABETES ASSESSMENT     Current Diabetes Medication Regimen    Insulin Toujeo Max 300 unit/mL, 20 units at bedtime   Humalog 100 unit/mL  5 units breakfast  2 units lunch  5 units dinner    Secondary Prevention  Statin? No  ACE-I/ARB? No  Aspirin? No    Pertinent PMH Review:  PMH of Pancreatitis: No  PMH of Retinopathy: No  PMH/FH of Medullary Thyroid Carcinoma or Multiple Endocrine Neoplasia Type 2: No  PMH of Urinary Tract Infections: No    Health Maintenance: Not discussed  Foot Exam:   Eye Exam:   Lipid Panel: LDL 93 mg/dL and triglycerides 147 mg/dL  as of 24  Urine Albumin: 147.5 mg/L as of 24    DIET: Working on with Diabetes Education  EXERCISE: Working on with Diabetes Education    Current monitoring regimen:   Patient is using: continuous glucose monitor, Eleni 3    Patient-Reported Blood Glucose:  Patient notes AM FPG ranging 80s-200s  Patient has not had any overnight lows since adjustments made on Tuesday by [facility] Endocrinology    Diabetes Patient Education    PATIENT GOALS   Fasting B - 130 mg/dL 2-HR Postprandial BG:   Less than 180 mg/dL A1c:   Less than 6.5 % or 7.0 %     Rule of 15: eating ~15 g of carbs when BG less than 80 (chocolate, half cup juice, 3-4 glucose tabs).  Recognize symptoms of high and low blood sugar.   Eat a realistic healthy diet consisting of fruits, vegetables, fiber, protein food choices on a regular basis and be aware of portion/serving sizes. Reduce carbohydrate consumption and always consume with protein and fat. Avoid  "foods high in saturated/trans fat, high salt content, and sweets and beverages with added sugars.  Limit alcohol consumption; alcohol may affect your blood sugar and cause hypoglycemia.   If not at a healthy weight, stay active and incorporate ~30 mins of exercise into your daily routine to manage your weight and increase the body's acceptance of insulin.      Laboratory Results  Lab Results   Component Value Date    BILITOT 0.4 04/29/2024    CALCIUM 9.0 04/29/2024    CO2 27 04/29/2024     04/29/2024    CREATININE 0.79 04/29/2024    GLUCOSE 258 (H) 04/29/2024    ALKPHOS 76 04/29/2024    K 3.4 (L) 04/29/2024    PROT 7.0 04/29/2024     04/29/2024    AST 18 04/29/2024    ALT 26 04/29/2024    BUN 7 04/29/2024    ANIONGAP 10 04/29/2024    ALBUMIN 4.4 04/29/2024    GFRF >90 12/05/2022    EGFR >90 04/29/2024     Lab Results   Component Value Date    TRIG 147 04/29/2024    CHOL 187 04/29/2024    HDL 64.3 04/29/2024     Lab Results   Component Value Date    HGBA1C 10.1 (H) 04/29/2024       Assessment/Plan   Problem List Items Addressed This Visit       Type 1 diabetes mellitus with diabetic microalbuminuria (Multi)    Relevant Medications    pen needle, diabetic (Pen Needle) 31 gauge x 5/16\" needle     General Patient Discussion  Patient has recently (within the past 1 month) been diagnosed with autoimmune-related Type 1 Diabetes.   Patient had an appointment this past Tuesday 5/14/24 with [facility] Endocrinology (Dr. Williams Andrews)  Toujeo Max reduced to 20 units nightly  Humalog reduced to 5-2-5 units daily  Endocrinology would like to pursue an insulin pump for patient  As patient is now being managed by  Endocrinology, no dose adjustments made to patient's insulin regimen. Patient encouraged to contact Dr. Andrews's office with any questions/concerns related to insulin dosing or BG values.   In regards to recent supply issues, new prescription sent to [facility] pharmacy for pen needles per pt request. These " will be used to administer insulin until pump arrives (currently in process with [facility])  Patient to switch to Islet pump with Dexcom G7 once supplies received from [facility]. Will continue to fingerstick until then.    Plan/Changes   Continue all meds under the continuation of care with the referring provider and clinical pharmacy team  Specialists: Patient currently sees outpatient endocrinology.  Continue testing supplies: glucometer, strips, lancets, alcohol swabs, send to local pharmacy    Next Clinical Pharmacy Follow-Up  - PRN per pt request      Manuel Rodriguez, PharmD     Verbal consent to manage patient's drug therapy was obtained from the patient. They were informed they may decline to participate or withdraw from participation in pharmacy services at any time.

## 2024-06-18 NOTE — TELEPHONE ENCOUNTER
----- Message from Jason Luna DO sent at 6/17/2024  8:30 PM EDT -----  Regarding: FW: Still no insulin pump or Dexcom  Contact: [phone]  Lets see if we can work this out and coordinate her care a little bit better I would prefer to get your supplies through [facility] and for now until the pump comes in you can use the test strips and continue to use your insulin as you have been doing and we will make a referral to our pharmacist to help with coordinating with insurance since this has been a hot mess.  We will follow-up on when we can expect the pump to arrive we will call endocrine office for follow-up to see what the status is we can expedite and make things better better thank you for the feedback.  ----- Message -----  From: Taryn Rick CMA  Sent: 6/17/2024   2:26 PM EDT  To: Jason Luna DO  Subject: FW: Still no insulin pump or Dexcom                ----- Message -----  From: Arlene Moore  Sent: 6/17/2024   1:56 PM EDT  To:  [place] [address] [facility] Clinical Support Staff  Subject: Still no insulin pump or Dexcom                  I was hoping this would’ve worked out by now but I’m still in an endless loop of talking to [employer], [employer], [employer] and the pharmacy.     I made it as far as the ordering process with the pump and was waiting on Dr. Andrews’s office to do their part. That’s when I stopped hearing any progress as far as having it shipped.     In the meantime, when the order for the pump was placed, I’m told that the Dexcom monitor you called in is on hold because the pump I’m getting includes one. So I don’t have that either.     I’m thoroughly confused as far as who to get my needles and test strips, etc. refilled with. I am now enrolled in the Diabetic Management Program through [employer], but [facility].      I’m sorry to be a pain, but at the point where I throw in the towel as far as trying to this for myself. If  it gets here, it does. If not, I just don’t have the time to continue to joseph this stuff around    Thanks.     I’m told to use [facility], [facility].

## 2024-06-24 ENCOUNTER — APPOINTMENT (OUTPATIENT)
Dept: PRIMARY CARE | Facility: CLINIC | Age: 39
End: 2024-06-24
Payer: COMMERCIAL

## 2024-06-26 ENCOUNTER — APPOINTMENT (OUTPATIENT)
Dept: PHARMACY | Facility: HOSPITAL | Age: 39
End: 2024-06-26
Payer: COMMERCIAL

## 2024-07-09 ENCOUNTER — TELEPHONE (OUTPATIENT)
Dept: PRIMARY CARE | Facility: CLINIC | Age: 39
End: 2024-07-09
Payer: COMMERCIAL

## 2024-07-09 NOTE — TELEPHONE ENCOUNTER
Called patient left message for her to call office back.    Dr. Luna had Jenna call over to Dr. Jaffe office to see if they can get her in any sooner than her appointment. Patient is having many issues with her insulin pump, and controlling her blood sugars.   Unfortunately they are booked out they suggested to have patient call the office at [phone] periodically for possible sooner appointment patient is on wait list.

## 2024-08-27 DIAGNOSIS — G43.711 INTRACTABLE CHRONIC MIGRAINE WITHOUT AURA AND WITH STATUS MIGRAINOSUS: ICD-10-CM

## 2024-08-27 DIAGNOSIS — I10 PRIMARY HYPERTENSION: Primary | ICD-10-CM

## 2024-08-27 DIAGNOSIS — G43.711 INTRACTABLE CHRONIC MIGRAINE WITHOUT AURA AND WITH STATUS MIGRAINOSUS: Primary | ICD-10-CM

## 2024-08-27 RX ORDER — ONDANSETRON 4 MG/1
4 TABLET, FILM COATED ORAL EVERY 8 HOURS PRN
Qty: 20 TABLET | Refills: 0 | Status: SHIPPED | OUTPATIENT
Start: 2024-08-27 | End: 2024-09-03

## 2024-08-27 RX ORDER — MAGNESIUM GLYCINATE 100 MG
2 CAPSULE ORAL NIGHTLY
Qty: 60 CAPSULE | Refills: 11 | Status: SHIPPED | OUTPATIENT
Start: 2024-08-27

## 2024-08-27 RX ORDER — AMLODIPINE BESYLATE 5 MG/1
5 TABLET ORAL DAILY
Qty: 90 TABLET | Refills: 3 | Status: SHIPPED | OUTPATIENT
Start: 2024-08-27 | End: 2025-08-27

## 2024-08-27 RX ORDER — LISINOPRIL 20 MG/1
20 TABLET ORAL DAILY
Qty: 100 TABLET | Refills: 3 | Status: SHIPPED | OUTPATIENT
Start: 2024-08-27 | End: 2025-10-01

## 2024-09-04 ENCOUNTER — APPOINTMENT (OUTPATIENT)
Dept: PRIMARY CARE | Facility: CLINIC | Age: 39
End: 2024-09-04
Payer: COMMERCIAL

## 2024-09-04 ENCOUNTER — LAB (OUTPATIENT)
Dept: LAB | Facility: LAB | Age: 39
End: 2024-09-04
Payer: COMMERCIAL

## 2024-09-04 VITALS
DIASTOLIC BLOOD PRESSURE: 110 MMHG | HEIGHT: 63 IN | SYSTOLIC BLOOD PRESSURE: 160 MMHG | WEIGHT: 136 LBS | BODY MASS INDEX: 24.1 KG/M2 | HEART RATE: 88 BPM

## 2024-09-04 DIAGNOSIS — I10 PRIMARY HYPERTENSION: Primary | ICD-10-CM

## 2024-09-04 DIAGNOSIS — R80.9 TYPE 1 DIABETES MELLITUS WITH DIABETIC MICROALBUMINURIA (MULTI): ICD-10-CM

## 2024-09-04 DIAGNOSIS — E05.00 GRAVES DISEASE: ICD-10-CM

## 2024-09-04 DIAGNOSIS — Z23 NEED FOR VACCINATION: ICD-10-CM

## 2024-09-04 DIAGNOSIS — E10.29 TYPE 1 DIABETES MELLITUS WITH DIABETIC MICROALBUMINURIA (MULTI): ICD-10-CM

## 2024-09-04 LAB
ALBUMIN SERPL BCP-MCNC: 4.4 G/DL (ref 3.4–5)
ALP SERPL-CCNC: 62 U/L (ref 33–110)
ALT SERPL W P-5'-P-CCNC: 22 U/L (ref 7–45)
ANION GAP SERPL CALC-SCNC: 11 MMOL/L (ref 10–20)
AST SERPL W P-5'-P-CCNC: 18 U/L (ref 9–39)
BILIRUB SERPL-MCNC: 0.7 MG/DL (ref 0–1.2)
BUN SERPL-MCNC: 12 MG/DL (ref 6–23)
CALCIUM SERPL-MCNC: 8.7 MG/DL (ref 8.6–10.3)
CHLORIDE SERPL-SCNC: 104 MMOL/L (ref 98–107)
CHOLEST SERPL-MCNC: 176 MG/DL (ref 0–199)
CHOLESTEROL/HDL RATIO: 2.5
CO2 SERPL-SCNC: 26 MMOL/L (ref 21–32)
CREAT SERPL-MCNC: 0.72 MG/DL (ref 0.5–1.05)
CREAT UR-MCNC: 82.6 MG/DL (ref 20–320)
EGFRCR SERPLBLD CKD-EPI 2021: >90 ML/MIN/1.73M*2
EST. AVERAGE GLUCOSE BLD GHB EST-MCNC: 151 MG/DL
GLUCOSE SERPL-MCNC: 99 MG/DL (ref 74–99)
HBA1C MFR BLD: 6.9 %
HDLC SERPL-MCNC: 70.9 MG/DL
LDLC SERPL CALC-MCNC: 87 MG/DL
MICROALBUMIN UR-MCNC: 63.1 MG/L
MICROALBUMIN/CREAT UR: 76.4 UG/MG CREAT
NON HDL CHOLESTEROL: 105 MG/DL (ref 0–149)
POTASSIUM SERPL-SCNC: 4 MMOL/L (ref 3.5–5.3)
PROT SERPL-MCNC: 7 G/DL (ref 6.4–8.2)
SODIUM SERPL-SCNC: 137 MMOL/L (ref 136–145)
TRIGL SERPL-MCNC: 91 MG/DL (ref 0–149)
VLDL: 18 MG/DL (ref 0–40)

## 2024-09-04 PROCEDURE — 3048F LDL-C <100 MG/DL: CPT | Performed by: INTERNAL MEDICINE

## 2024-09-04 PROCEDURE — 3008F BODY MASS INDEX DOCD: CPT | Performed by: INTERNAL MEDICINE

## 2024-09-04 PROCEDURE — 4004F PT TOBACCO SCREEN RCVD TLK: CPT | Performed by: INTERNAL MEDICINE

## 2024-09-04 PROCEDURE — 36415 COLL VENOUS BLD VENIPUNCTURE: CPT

## 2024-09-04 PROCEDURE — 3077F SYST BP >= 140 MM HG: CPT | Performed by: INTERNAL MEDICINE

## 2024-09-04 PROCEDURE — 83036 HEMOGLOBIN GLYCOSYLATED A1C: CPT

## 2024-09-04 PROCEDURE — 4010F ACE/ARB THERAPY RXD/TAKEN: CPT | Performed by: INTERNAL MEDICINE

## 2024-09-04 PROCEDURE — 3046F HEMOGLOBIN A1C LEVEL >9.0%: CPT | Performed by: INTERNAL MEDICINE

## 2024-09-04 PROCEDURE — 82043 UR ALBUMIN QUANTITATIVE: CPT

## 2024-09-04 PROCEDURE — 90677 PCV20 VACCINE IM: CPT | Performed by: INTERNAL MEDICINE

## 2024-09-04 PROCEDURE — 99213 OFFICE O/P EST LOW 20 MIN: CPT | Performed by: INTERNAL MEDICINE

## 2024-09-04 PROCEDURE — 3080F DIAST BP >= 90 MM HG: CPT | Performed by: INTERNAL MEDICINE

## 2024-09-04 PROCEDURE — 90471 IMMUNIZATION ADMIN: CPT | Performed by: INTERNAL MEDICINE

## 2024-09-04 PROCEDURE — 3060F POS MICROALBUMINURIA REV: CPT | Performed by: INTERNAL MEDICINE

## 2024-09-04 PROCEDURE — 80053 COMPREHEN METABOLIC PANEL: CPT

## 2024-09-04 PROCEDURE — 80061 LIPID PANEL: CPT

## 2024-09-04 PROCEDURE — 90656 IIV3 VACC NO PRSV 0.5 ML IM: CPT | Performed by: INTERNAL MEDICINE

## 2024-09-04 PROCEDURE — 82570 ASSAY OF URINE CREATININE: CPT

## 2024-09-04 PROCEDURE — 3044F HG A1C LEVEL LT 7.0%: CPT | Performed by: INTERNAL MEDICINE

## 2024-09-04 PROCEDURE — 90472 IMMUNIZATION ADMIN EACH ADD: CPT | Performed by: INTERNAL MEDICINE

## 2024-09-04 RX ORDER — AMLODIPINE BESYLATE 5 MG/1
5 TABLET ORAL DAILY
Qty: 90 TABLET | Refills: 3 | Status: SHIPPED | OUTPATIENT
Start: 2024-09-04 | End: 2025-09-04

## 2024-09-04 ASSESSMENT — ENCOUNTER SYMPTOMS
HEADACHES: 1
PHOTOPHOBIA: 1
NECK PAIN: 1
NAUSEA: 1

## 2024-09-04 NOTE — ASSESSMENT & PLAN NOTE
Add amlodipine 5 mg daily with lisinopril 20 mg daily reevaluate in 4 weeks with blood work    Orders:    amLODIPine (Norvasc) 5 mg tablet; Take 1 tablet (5 mg) by mouth once daily.    Follow Up In [facility] - PCP - Established; Future    Follow Up In [facility] - Pharmacy; Future    Referral to Ophthalmology; Future

## 2024-09-04 NOTE — PROGRESS NOTES
"Subjective   Patient ID: Arlene Moore is a 39 y.o. female who presents for Follow-up and Headache.    HPI     Review of Systems    Objective   BP (!) 168/109   Pulse 88   Ht 1.6 m (5' 3\")   Wt 61.7 kg (136 lb)   BMI 24.09 kg/m²     Physical Exam    Assessment/Plan          "

## 2024-09-04 NOTE — ASSESSMENT & PLAN NOTE
Reevaluate lab work started on lisinopril 20 mg daily.  Patient on insulin pump using short acting insulin has follow-up with endocrine specialist next visit next week Prevnar 20 and influenza vaccine updated and given today referral to pharmacologist for use of Humalog insulin and insulin pump and coverage    Orders:    Follow Up In [facility] - PCP - Established    Follow Up In [facility] - PCP - Established; Future    Follow Up In [facility] - Pharmacy; Future    Referral to Ophthalmology; Future

## 2024-09-04 NOTE — PROGRESS NOTES
"Subjective   Reason for Visit: Arlene Moore is an 39 y.o. female here for a Medicare Wellness visit.     Past Medical, Surgical, and Family History reviewed and updated in chart.    Reviewed all medications by prescribing practitioner or clinical pharmacist (such as prescriptions, OTCs, herbal therapies and supplements) and documented in the medical record.    Headache   This is a recurrent problem. The current episode started 1 to 4 weeks ago. The problem has been waxing and waning. The pain is located in the Left unilateral region. The pain is at a severity of 5/10. The pain is mild. Associated symptoms include nausea, neck pain, phonophobia and photophobia. Nothing aggravates the symptoms. She has tried acetaminophen, darkened room and NSAIDs for the symptoms. The treatment provided moderate relief. Her past medical history is significant for hypertension.       Patient Care Team:  Jason Luna DO as PCP - General  Jason Luna DO as PCP - [facility] PCP     Review of Systems   Eyes:  Positive for photophobia.   Gastrointestinal:  Positive for nausea.   Musculoskeletal:  Positive for neck pain.   Neurological:  Positive for headaches.       Objective   Vitals:  BP (!) 160/110   Pulse 88   Ht 1.6 m (5' 3\")   Wt 61.7 kg (136 lb)   BMI 24.09 kg/m²       Physical Exam    Assessment & Plan  Type 1 diabetes mellitus with diabetic microalbuminuria (Multi)  Reevaluate lab work started on lisinopril 20 mg daily.  Patient on insulin pump using short acting insulin has follow-up with endocrine specialist next visit next week Prevnar 20 and influenza vaccine updated and given today referral to pharmacologist for use of Humalog insulin and insulin pump and coverage    Orders:    Follow Up In [facility] - PCP - Established    Follow Up In [facility] - PCP - Established; Future    Follow Up In [facility] - Pharmacy; Future    Referral to Ophthalmology; Future    Primary " hypertension  Add amlodipine 5 mg daily with lisinopril 20 mg daily reevaluate in 4 weeks with blood work    Orders:    amLODIPine (Norvasc) 5 mg tablet; Take 1 tablet (5 mg) by mouth once daily.    Follow Up In [facility] - PCP - Established; Future    Follow Up In [facility] - Pharmacy; Future    Referral to Ophthalmology; Future    Need for vaccination    Orders:    Flu vaccine, trivalent, preservative free, age 6 months and greater (Fluraix/Fluzone/Flulaval)    Pneumococcal conjugate vaccine, 20-valent (PREVNAR 20)    Graves disease  Currently in remission reevaluate thyroid profile with next visit

## 2024-09-04 NOTE — ASSESSMENT & PLAN NOTE
Orders:    Flu vaccine, trivalent, preservative free, age 6 months and greater (Fluraix/Fluzone/Flulaval)    Pneumococcal conjugate vaccine, 20-valent (PREVNAR 20)

## 2024-09-27 ENCOUNTER — APPOINTMENT (OUTPATIENT)
Dept: PHARMACY | Facility: HOSPITAL | Age: 39
End: 2024-09-27
Payer: COMMERCIAL

## 2024-09-27 DIAGNOSIS — I10 PRIMARY HYPERTENSION: ICD-10-CM

## 2024-09-27 DIAGNOSIS — E10.29 TYPE 1 DIABETES MELLITUS WITH DIABETIC MICROALBUMINURIA (MULTI): ICD-10-CM

## 2024-09-27 DIAGNOSIS — R80.9 TYPE 1 DIABETES MELLITUS WITH DIABETIC MICROALBUMINURIA (MULTI): ICD-10-CM

## 2024-09-27 RX ORDER — INSULIN LISPRO 100 [IU]/ML
INJECTION, SOLUTION INTRAVENOUS; SUBCUTANEOUS
Qty: 20 ML | Refills: 3 | Status: SHIPPED | OUTPATIENT
Start: 2024-09-27

## 2024-09-27 NOTE — PROGRESS NOTES
Patient referred to pharmacy team for assistance with obtaining Humalog refill for use in insulin pump. Patient currently scheduled to transition to Dr. Jaffe for endo in October, but was out of insulin for pump. Pharmacy team provided new refills for Humalog to bridge patient to endo. Patient will follow pump directions from previous endo. Current price 25$ per month for insulin (depending on what is defined  as 1 month supply).

## 2024-10-04 ENCOUNTER — APPOINTMENT (OUTPATIENT)
Dept: PRIMARY CARE | Facility: CLINIC | Age: 39
End: 2024-10-04
Payer: COMMERCIAL

## 2024-10-18 ENCOUNTER — APPOINTMENT (OUTPATIENT)
Dept: PRIMARY CARE | Facility: CLINIC | Age: 39
End: 2024-10-18
Payer: COMMERCIAL

## 2024-10-24 ENCOUNTER — APPOINTMENT (OUTPATIENT)
Dept: ENDOCRINOLOGY | Facility: CLINIC | Age: 39
End: 2024-10-24
Payer: COMMERCIAL

## 2024-10-24 VITALS
SYSTOLIC BLOOD PRESSURE: 140 MMHG | DIASTOLIC BLOOD PRESSURE: 76 MMHG | WEIGHT: 138.6 LBS | HEIGHT: 64 IN | BODY MASS INDEX: 23.66 KG/M2 | HEART RATE: 101 BPM

## 2024-10-24 DIAGNOSIS — E10.29 TYPE 1 DIABETES MELLITUS WITH DIABETIC MICROALBUMINURIA (MULTI): ICD-10-CM

## 2024-10-24 DIAGNOSIS — E05.00 GRAVES DISEASE: ICD-10-CM

## 2024-10-24 DIAGNOSIS — R80.9 TYPE 1 DIABETES MELLITUS WITH DIABETIC MICROALBUMINURIA (MULTI): ICD-10-CM

## 2024-10-24 DIAGNOSIS — Z96.41 INSULIN PUMP IN PLACE: Primary | ICD-10-CM

## 2024-10-24 PROCEDURE — 4010F ACE/ARB THERAPY RXD/TAKEN: CPT | Performed by: INTERNAL MEDICINE

## 2024-10-24 PROCEDURE — 3077F SYST BP >= 140 MM HG: CPT | Performed by: INTERNAL MEDICINE

## 2024-10-24 PROCEDURE — 3008F BODY MASS INDEX DOCD: CPT | Performed by: INTERNAL MEDICINE

## 2024-10-24 PROCEDURE — 3048F LDL-C <100 MG/DL: CPT | Performed by: INTERNAL MEDICINE

## 2024-10-24 PROCEDURE — 95251 CONT GLUC MNTR ANALYSIS I&R: CPT | Performed by: INTERNAL MEDICINE

## 2024-10-24 PROCEDURE — 3044F HG A1C LEVEL LT 7.0%: CPT | Performed by: INTERNAL MEDICINE

## 2024-10-24 PROCEDURE — 99204 OFFICE O/P NEW MOD 45 MIN: CPT | Performed by: INTERNAL MEDICINE

## 2024-10-24 PROCEDURE — 3060F POS MICROALBUMINURIA REV: CPT | Performed by: INTERNAL MEDICINE

## 2024-10-24 PROCEDURE — 3078F DIAST BP <80 MM HG: CPT | Performed by: INTERNAL MEDICINE

## 2024-10-24 RX ORDER — INSULIN LISPRO 100 [IU]/ML
INJECTION, SOLUTION INTRAVENOUS; SUBCUTANEOUS
Qty: 30 ML | Refills: 6 | Status: SHIPPED | OUTPATIENT
Start: 2024-10-24

## 2024-10-24 ASSESSMENT — ENCOUNTER SYMPTOMS
NUMBNESS: 0
NECK PAIN: 1
POLYDIPSIA: 0
POLYPHAGIA: 0
FATIGUE: 0
DIAPHORESIS: 1

## 2024-10-28 ENCOUNTER — APPOINTMENT (OUTPATIENT)
Dept: PRIMARY CARE | Facility: CLINIC | Age: 39
End: 2024-10-28
Payer: COMMERCIAL

## 2024-10-30 PROBLEM — Z96.41 INSULIN PUMP IN PLACE: Status: ACTIVE | Noted: 2024-10-30

## 2024-10-31 ENCOUNTER — APPOINTMENT (OUTPATIENT)
Dept: ENDOCRINOLOGY | Facility: CLINIC | Age: 39
End: 2024-10-31
Payer: COMMERCIAL

## 2024-11-25 DIAGNOSIS — F41.0 PANIC DISORDER (EPISODIC PAROXYSMAL ANXIETY): ICD-10-CM

## 2024-11-25 DIAGNOSIS — F41.1 GENERALIZED ANXIETY DISORDER: ICD-10-CM

## 2024-11-25 RX ORDER — VENLAFAXINE HYDROCHLORIDE 150 MG/1
CAPSULE, EXTENDED RELEASE ORAL
Qty: 60 CAPSULE | Refills: 11 | Status: SHIPPED | OUTPATIENT
Start: 2024-11-25

## 2024-11-25 RX ORDER — VENLAFAXINE HYDROCHLORIDE 150 MG/1
150 CAPSULE, EXTENDED RELEASE ORAL
Qty: 180 CAPSULE | Refills: 3 | Status: SHIPPED | OUTPATIENT
Start: 2024-11-25 | End: 2025-11-25

## 2024-12-12 ENCOUNTER — APPOINTMENT (OUTPATIENT)
Dept: PRIMARY CARE | Facility: CLINIC | Age: 39
End: 2024-12-12
Payer: COMMERCIAL

## 2024-12-12 VITALS
HEART RATE: 100 BPM | BODY MASS INDEX: 24.07 KG/M2 | HEIGHT: 64 IN | WEIGHT: 141 LBS | DIASTOLIC BLOOD PRESSURE: 90 MMHG | SYSTOLIC BLOOD PRESSURE: 122 MMHG

## 2024-12-12 DIAGNOSIS — Z96.41 INSULIN PUMP IN PLACE: ICD-10-CM

## 2024-12-12 DIAGNOSIS — I10 PRIMARY HYPERTENSION: ICD-10-CM

## 2024-12-12 DIAGNOSIS — E05.00 GRAVES DISEASE: ICD-10-CM

## 2024-12-12 DIAGNOSIS — F41.1 GENERALIZED ANXIETY DISORDER: ICD-10-CM

## 2024-12-12 DIAGNOSIS — E10.29 TYPE 1 DIABETES MELLITUS WITH DIABETIC MICROALBUMINURIA (MULTI): Primary | ICD-10-CM

## 2024-12-12 DIAGNOSIS — F41.0 PANIC DISORDER (EPISODIC PAROXYSMAL ANXIETY): ICD-10-CM

## 2024-12-12 DIAGNOSIS — R80.9 TYPE 1 DIABETES MELLITUS WITH DIABETIC MICROALBUMINURIA (MULTI): Primary | ICD-10-CM

## 2024-12-12 PROCEDURE — 3008F BODY MASS INDEX DOCD: CPT | Performed by: INTERNAL MEDICINE

## 2024-12-12 PROCEDURE — 3074F SYST BP LT 130 MM HG: CPT | Performed by: INTERNAL MEDICINE

## 2024-12-12 PROCEDURE — 3060F POS MICROALBUMINURIA REV: CPT | Performed by: INTERNAL MEDICINE

## 2024-12-12 PROCEDURE — 4004F PT TOBACCO SCREEN RCVD TLK: CPT | Performed by: INTERNAL MEDICINE

## 2024-12-12 PROCEDURE — 99213 OFFICE O/P EST LOW 20 MIN: CPT | Performed by: INTERNAL MEDICINE

## 2024-12-12 PROCEDURE — 3080F DIAST BP >= 90 MM HG: CPT | Performed by: INTERNAL MEDICINE

## 2024-12-12 PROCEDURE — 3044F HG A1C LEVEL LT 7.0%: CPT | Performed by: INTERNAL MEDICINE

## 2024-12-12 PROCEDURE — 3048F LDL-C <100 MG/DL: CPT | Performed by: INTERNAL MEDICINE

## 2024-12-12 PROCEDURE — 4010F ACE/ARB THERAPY RXD/TAKEN: CPT | Performed by: INTERNAL MEDICINE

## 2024-12-12 RX ORDER — LISINOPRIL 40 MG/1
40 TABLET ORAL DAILY
Qty: 100 TABLET | Refills: 3 | Status: SHIPPED | OUTPATIENT
Start: 2024-12-12 | End: 2026-01-16

## 2024-12-12 RX ORDER — VENLAFAXINE HYDROCHLORIDE 150 MG/1
150 CAPSULE, EXTENDED RELEASE ORAL DAILY
Qty: 90 CAPSULE | Refills: 3 | Status: SHIPPED | OUTPATIENT
Start: 2024-12-12

## 2024-12-12 NOTE — PROGRESS NOTES
"Subjective   Patient ID: Arlene Moore is a 39 y.o. female who presents for Follow-up.    HPI     Review of Systems    Objective   /90   Pulse 100   Ht 1.619 m (5' 3.75\")   Wt 64 kg (141 lb)   BMI 24.39 kg/m²     Physical Exam    Assessment/Plan          "

## 2024-12-12 NOTE — PROGRESS NOTES
"Subjective   Reason for Visit: Arlene Moore is an 39 y.o. female here for a diabetes follow-up  visit.     Past Medical, Surgical, and Family History reviewed and updated in chart.    Reviewed all medications by prescribing practitioner or clinical pharmacist (such as prescriptions, OTCs, herbal therapies and supplements) and documented in the medical record.    HPI    Patient Care Team:  Jason Luna DO as PCP - General  Jason Luna DO as PCP - [facility] PCP     Review of Systems   All other systems reviewed and are negative.      Objective   Vitals:  /90   Pulse 100   Ht 1.619 m (5' 3.75\")   Wt 64 kg (141 lb)   BMI 24.39 kg/m²       Physical Exam  Vitals and nursing note reviewed.   Constitutional:       General: She is not in acute distress.     Appearance: Normal appearance. She is well-developed. She is not toxic-appearing.   HENT:      Head: Normocephalic and atraumatic.      Right Ear: Tympanic membrane and external ear normal.      Left Ear: Tympanic membrane and external ear normal.      Nose: Nose normal.      Mouth/Throat:      Mouth: Mucous membranes are moist.      Pharynx: Oropharynx is clear. No oropharyngeal exudate or posterior oropharyngeal erythema.      Tonsils: No tonsillar exudate. 2+ on the right. 2+ on the left.   Eyes:      Extraocular Movements: Extraocular movements intact.      Conjunctiva/sclera: Conjunctivae normal.   Cardiovascular:      Rate and Rhythm: Normal rate and regular rhythm.      Pulses: Normal pulses.      Heart sounds: Normal heart sounds. No murmur heard.  Pulmonary:      Effort: Pulmonary effort is normal.      Breath sounds: Normal breath sounds.   Abdominal:      General: Abdomen is flat. Bowel sounds are normal.      Palpations: Abdomen is soft.   Musculoskeletal:      Cervical back: Neck supple.   Lymphadenopathy:      Cervical: No cervical adenopathy.   Skin:     General: Skin is warm and dry.      Findings: No rash.   Neurological:      " Mental Status: She is alert. Mental status is at baseline.   Psychiatric:         Mood and Affect: Mood normal.         Behavior: Behavior normal.         Thought Content: Thought content normal.         Judgment: Judgment normal.         Assessment & Plan  Type 1 diabetes mellitus with diabetic microalbuminuria (Multi)  Doing well on insulin pump reported mild hyperglycemia at night written for glucagon emergency kit hemoglobin A1c improved to 6.9 will increase lisinopril to 40 mg daily for treatment of microalbuminuria also improving reevaluate blood work  Orders:    Follow [facility] - PCP - Established    glucagon (Glucagen) 1 mg injection; Inject 1 mg under the skin 1 time if needed for low blood sugar - see comments (hypogycemia).    Follow Up In [facility] - PCP - Established; Future    Primary hypertension  Blood pressures stable at this time although elevated diastolic blood pressures continue may be component of whitecoat hypertension and anxiety increase lisinopril 40 mg daily with amlodipine 5 mg daily patient remains to measure doses of antihypertensive follow closely  Orders:    Follow Up In [facility] - PCP - Established    lisinopril 40 mg tablet; Take 1 tablet (40 mg) by mouth once daily.    Generalized anxiety disorder  Much improved at this time with reduced dose venlafaxine 150 mg 1 tablet daily refilled today reevaluate next visit  Orders:    venlafaxine XR (Effexor-XR) 150 mg 24 hr capsule; Take 1 capsule (150 mg) by mouth once daily. Do not crush or chew.    Panic disorder (episodic paroxysmal anxiety)  Stable with venlafaxine refilled today  Orders:    venlafaxine XR (Effexor-XR) 150 mg 24 hr capsule; Take 1 capsule (150 mg) by mouth once daily. Do not crush or chew.    Insulin pump in place  To follow closely with endocrinology doing well with management       Graves disease  History of thyroiditis continue to follow thyroid function closely no signs or  symptoms of hyperthyroidism at this time

## 2024-12-12 NOTE — ASSESSMENT & PLAN NOTE
Much improved at this time with reduced dose venlafaxine 150 mg 1 tablet daily refilled today reevaluate next visit  Orders:    venlafaxine XR (Effexor-XR) 150 mg 24 hr capsule; Take 1 capsule (150 mg) by mouth once daily. Do not crush or chew.

## 2024-12-12 NOTE — ASSESSMENT & PLAN NOTE
Blood pressures stable at this time although elevated diastolic blood pressures continue may be component of whitecoat hypertension and anxiety increase lisinopril 40 mg daily with amlodipine 5 mg daily patient remains to measure doses of antihypertensive follow closely  Orders:    Follow Up In [facility] - PCP - Established    lisinopril 40 mg tablet; Take 1 tablet (40 mg) by mouth once daily.

## 2024-12-12 NOTE — ASSESSMENT & PLAN NOTE
Doing well on insulin pump reported mild hyperglycemia at night written for glucagon emergency kit hemoglobin A1c improved to 6.9 will increase lisinopril to 40 mg daily for treatment of microalbuminuria also improving reevaluate blood work  Orders:    Follow Up In [facility] - PCP - Established    glucagon (Glucagen) 1 mg injection; Inject 1 mg under the skin 1 time if needed for low blood sugar - see comments (hypogycemia).    Follow Up In [facility] - PCP - Established; Future

## 2024-12-12 NOTE — ASSESSMENT & PLAN NOTE
History of thyroiditis continue to follow thyroid function closely no signs or symptoms of hyperthyroidism at this time

## 2024-12-12 NOTE — ASSESSMENT & PLAN NOTE
Stable with venlafaxine refilled today  Orders:    venlafaxine XR (Effexor-XR) 150 mg 24 hr capsule; Take 1 capsule (150 mg) by mouth once daily. Do not crush or chew.

## 2025-01-30 NOTE — PATIENT INSTRUCTIONS
Thank you for choosing [facility]  for your health care needs.  If you have any questions, concerns or medical needs, please feel free to contact our office at [phone].    Please ensure you complete your blood work one week before the next scheduled appointment.    To continue with the insulin pump but please switch to the Medtroninc 780G pump  To continue the use of your CGM for the intensive glucose monitoring due to the dynamic nature of insulin requirements, the narrow therapeutic index of insulin and the potentially fatal consequences of treatment  Counseled that the time in range should be >70%  Counseled that the goal A1C should be 7% or less and thus you are at goal   Counseled glycemic control is warranted to prevent microvascular complications  Please get a medical alert bracelet/necklace and wear at all times   To obtain fasting blood and urine tests   For follow up in 3 months with repeat labs

## 2025-01-30 NOTE — PROGRESS NOTES
"  Subjective   Arlene Moore is a 39 y.o. female who presents for follow up for Type 1 diabetes mellitus. The initial diagnosis of diabetes was made in May 2024 . The patient’s diagnosis of diabetes mellitus was based on routine blood tests.  The patient does have a known family history of diabetes. - paternal grandmother, mother, father, maternal grandfather, maternal side - type 2     She was previously seeing another endocrinologist, She was started on the islet pump from [employer].      She is a single mother of three children - 13, 11, 5 years     She was diagnosed with Graves' disease in 2014.  This has since been in remission.      She has been getting bilateral feet pain but this is rare and intermittent     Known complications due to diabetes included none    Cardiovascular risk factors include diabetes mellitus and hypertension. The patient is on an ACE inhibitor or angiotensin II receptor blocker.  The patient has not been previously hospitalized due to diabetic ketoacidosis.     Current symptoms/problems include none. Her clinical course has been stable.     The patient is currently checking the blood glucose multiple times per day.    Patient is using: continuous glucose monitor                                                                                Hypoglycemia frequency: 2%  Hypoglycemia awareness: Yes     Exercise: denies; but remains active      Review of Systems   Constitutional:  Positive for diaphoresis.   Neurological:         Tingling    All other systems reviewed and are negative.      Objective   BP (!) 144/94   Pulse 98   Ht 1.619 m (5' 3.75\")   Wt 66 kg (145 lb 6.4 oz)   BMI 25.15 kg/m²   Physical Exam  Vitals and nursing note reviewed.   Constitutional:       General: She is not in acute distress.     Appearance: Normal appearance. She is normal weight.   HENT:      Head: Normocephalic and atraumatic.      Nose: Nose normal.      Mouth/Throat:      Mouth: Mucous membranes are " moist.   Eyes:      Extraocular Movements: Extraocular movements intact.   Cardiovascular:      Rate and Rhythm: Normal rate and regular rhythm.   Pulmonary:      Effort: Pulmonary effort is normal.      Breath sounds: Normal breath sounds.   Musculoskeletal:         General: Normal range of motion.   Skin:     General: Skin is warm.   Neurological:      Mental Status: She is alert and oriented to person, place, and time.      Comments: No tremors to outstretched hands     Psychiatric:         Mood and Affect: Mood normal.         Lab Review  Lab Results   Component Value Date    HGBA1C 6.9 (H) 09/04/2024     Lab Results   Component Value Date    GLUCOSE 99 09/04/2024    CALCIUM 8.7 09/04/2024     09/04/2024    K 4.0 09/04/2024    CO2 26 09/04/2024     09/04/2024    BUN 12 09/04/2024    CREATININE 0.72 09/04/2024     Lab Results   Component Value Date    CHOL 176 09/04/2024    CHOL 187 04/29/2024    CHOL 139 10/08/2021     Lab Results   Component Value Date    HDL 70.9 09/04/2024    HDL 64.3 04/29/2024    HDL 45.6 10/08/2021     Lab Results   Component Value Date    LDLCALC 87 09/04/2024    LDLCALC 93 04/29/2024     Lab Results   Component Value Date    TRIG 91 09/04/2024    TRIG 147 04/29/2024    TRIG 80 10/08/2021     Lab Results   Component Value Date    TSH 2.02 04/29/2024         Health Maintenance:   Foot Exam:   Eye Exam:  Urine Albumin:  September 4, 2024    CGM Interpretation  14 day CGM download was reviewed in detail as documented above and will be attached to chart.  A minimum of 72 hours of glucose data was used to inform the management plan outlined below.    Sufficient data to analyze:  Yes; CGM active 74% of the time  54% of values is above target range, which is elevated above goal.    45% of values is spent in target range, and thus is below goal   2% of values is spent with hypoglycemia, and thus at goal         Assessment/Plan     39-year-old female presents for follow up for type 1  diabetes mellitus.  Her blood pressure is elevated above goal.    Type 1 diabetes mellitus with diabetic microalbuminuria (Multi)  To continue with the insulin pump but please switch to the Medtroninc 780G pump  To continue the use of your CGM for the intensive glucose monitoring due to the dynamic nature of insulin requirements, the narrow therapeutic index of insulin and the potentially fatal consequences of treatment  Counseled that the time in range should be >70%  Counseled that the goal A1C should be 7% or less   Counseled glycemic control is warranted to prevent microvascular complications  Please get a medical alert bracelet/necklace and wear at all times   To obtain fasting blood and urine tests       Insulin pump in place  Will switch to Medtorninc 780G    Graves disease  To obtain TFTs    For follow up in 3 months with repeat labs

## 2025-01-31 ENCOUNTER — APPOINTMENT (OUTPATIENT)
Dept: ENDOCRINOLOGY | Facility: CLINIC | Age: 40
End: 2025-01-31
Payer: COMMERCIAL

## 2025-01-31 VITALS
DIASTOLIC BLOOD PRESSURE: 94 MMHG | BODY MASS INDEX: 24.82 KG/M2 | HEIGHT: 64 IN | HEART RATE: 98 BPM | SYSTOLIC BLOOD PRESSURE: 144 MMHG | WEIGHT: 145.4 LBS

## 2025-01-31 DIAGNOSIS — Z96.41 INSULIN PUMP IN PLACE: ICD-10-CM

## 2025-01-31 DIAGNOSIS — E10.29 TYPE 1 DIABETES MELLITUS WITH DIABETIC MICROALBUMINURIA (MULTI): Primary | ICD-10-CM

## 2025-01-31 DIAGNOSIS — R80.9 TYPE 1 DIABETES MELLITUS WITH DIABETIC MICROALBUMINURIA (MULTI): Primary | ICD-10-CM

## 2025-01-31 DIAGNOSIS — E05.00 GRAVES DISEASE: ICD-10-CM

## 2025-01-31 PROCEDURE — 3080F DIAST BP >= 90 MM HG: CPT | Performed by: INTERNAL MEDICINE

## 2025-01-31 PROCEDURE — 4010F ACE/ARB THERAPY RXD/TAKEN: CPT | Performed by: INTERNAL MEDICINE

## 2025-01-31 PROCEDURE — 3008F BODY MASS INDEX DOCD: CPT | Performed by: INTERNAL MEDICINE

## 2025-01-31 PROCEDURE — 95251 CONT GLUC MNTR ANALYSIS I&R: CPT | Performed by: INTERNAL MEDICINE

## 2025-01-31 PROCEDURE — 99214 OFFICE O/P EST MOD 30 MIN: CPT | Performed by: INTERNAL MEDICINE

## 2025-01-31 PROCEDURE — 3077F SYST BP >= 140 MM HG: CPT | Performed by: INTERNAL MEDICINE

## 2025-01-31 RX ORDER — INSULIN LISPRO 100 [IU]/ML
INJECTION, SOLUTION INTRAVENOUS; SUBCUTANEOUS
Qty: 120 ML | Refills: 3 | Status: SHIPPED | OUTPATIENT
Start: 2025-01-31

## 2025-01-31 ASSESSMENT — ENCOUNTER SYMPTOMS: DIAPHORESIS: 1

## 2025-02-01 LAB
ALBUMIN SERPL-MCNC: 4.4 G/DL (ref 3.6–5.1)
ALP SERPL-CCNC: 71 U/L (ref 31–125)
ALT SERPL-CCNC: 21 U/L (ref 6–29)
ANION GAP SERPL CALCULATED.4IONS-SCNC: 10 MMOL/L (CALC) (ref 7–17)
AST SERPL-CCNC: 18 U/L (ref 10–30)
BILIRUB SERPL-MCNC: 0.4 MG/DL (ref 0.2–1.2)
BUN SERPL-MCNC: 11 MG/DL (ref 7–25)
CALCIUM SERPL-MCNC: 9.2 MG/DL (ref 8.6–10.2)
CHLORIDE SERPL-SCNC: 103 MMOL/L (ref 98–110)
CHOLEST SERPL-MCNC: 192 MG/DL
CHOLEST/HDLC SERPL: 2.8 (CALC)
CO2 SERPL-SCNC: 27 MMOL/L (ref 20–32)
CREAT SERPL-MCNC: 0.68 MG/DL (ref 0.5–0.99)
EGFRCR SERPLBLD CKD-EPI 2021: 113 ML/MIN/1.73M2
GLUCOSE SERPL-MCNC: 90 MG/DL (ref 65–139)
HDLC SERPL-MCNC: 68 MG/DL
LDLC SERPL CALC-MCNC: 104 MG/DL (CALC)
NONHDLC SERPL-MCNC: 124 MG/DL (CALC)
POTASSIUM SERPL-SCNC: 3.8 MMOL/L (ref 3.5–5.3)
PROT SERPL-MCNC: 7 G/DL (ref 6.1–8.1)
SODIUM SERPL-SCNC: 140 MMOL/L (ref 135–146)
TRIGL SERPL-MCNC: 103 MG/DL
TSH SERPL-ACNC: 0.61 MIU/L
VIT B12 SERPL-MCNC: 989 PG/ML (ref 200–1100)

## 2025-02-02 NOTE — ASSESSMENT & PLAN NOTE
To continue with the insulin pump but please switch to the Medtroninc 780G pump  To continue the use of your CGM for the intensive glucose monitoring due to the dynamic nature of insulin requirements, the narrow therapeutic index of insulin and the potentially fatal consequences of treatment  Counseled that the time in range should be >70%  Counseled that the goal A1C should be 7% or less   Counseled glycemic control is warranted to prevent microvascular complications  Please get a medical alert bracelet/necklace and wear at all times   To obtain fasting blood and urine tests

## 2025-02-02 NOTE — RESULT ENCOUNTER NOTE
Labs have been reviewed.  The LDL is minimally elevated above goal.  The kidney, thyroid and liver functions are normal.  The lab did obtain an A1C value.  This would need to be obtained in order to put on the Medtr[employer] form.

## 2025-02-03 ENCOUNTER — TELEPHONE (OUTPATIENT)
Dept: ENDOCRINOLOGY | Facility: CLINIC | Age: 40
End: 2025-02-03
Payer: COMMERCIAL

## 2025-02-03 NOTE — TELEPHONE ENCOUNTER
----- Message from Colt Jaffe sent at 2/2/2025  6:48 PM EST -----  Labs have been reviewed.  The LDL is minimally elevated above goal.  The kidney, thyroid and liver functions are normal.  The lab did obtain an A1C value.  This would need to be obtained in order to put on the [employer]tr[employer] form.

## 2025-02-07 ENCOUNTER — TELEPHONE (OUTPATIENT)
Dept: PRIMARY CARE | Facility: CLINIC | Age: 40
End: 2025-02-07
Payer: COMMERCIAL

## 2025-02-07 DIAGNOSIS — F41.0 PANIC DISORDER (EPISODIC PAROXYSMAL ANXIETY): ICD-10-CM

## 2025-02-07 DIAGNOSIS — F41.1 GENERALIZED ANXIETY DISORDER: ICD-10-CM

## 2025-02-07 RX ORDER — VENLAFAXINE HYDROCHLORIDE 150 MG/1
300 CAPSULE, EXTENDED RELEASE ORAL DAILY
Qty: 180 CAPSULE | Refills: 3 | Status: SHIPPED | OUTPATIENT
Start: 2025-02-07 | End: 2026-02-07

## 2025-02-07 RX ORDER — VENLAFAXINE HYDROCHLORIDE 150 MG/1
150 CAPSULE, EXTENDED RELEASE ORAL DAILY
Qty: 90 CAPSULE | Refills: 3 | Status: SHIPPED | OUTPATIENT
Start: 2025-02-07 | End: 2025-02-07 | Stop reason: SDUPTHER

## 2025-02-07 NOTE — TELEPHONE ENCOUNTER
She has not received prescription for Venlafaxine 150 mg from [facility] & she is out of medication    Asking if refill can be sent to [facility]

## 2025-02-15 LAB
EST. AVERAGE GLUCOSE BLD GHB EST-MCNC: 177 MG/DL
EST. AVERAGE GLUCOSE BLD GHB EST-SCNC: 9.8 MMOL/L
HBA1C MFR BLD: 7.8 % OF TOTAL HGB

## 2025-03-18 ENCOUNTER — APPOINTMENT (OUTPATIENT)
Dept: PRIMARY CARE | Facility: CLINIC | Age: 40
End: 2025-03-18
Payer: COMMERCIAL

## 2025-04-23 NOTE — PROGRESS NOTES
"Subjective   Arlene Moore is a 40 y.o. female who presents for follow up for Type 1 diabetes mellitus. The initial diagnosis of diabetes was made in May 2024 . The patient’s diagnosis of diabetes mellitus was based on routine blood tests.  The patient does have a known family history of diabetes. - paternal grandmother, mother, father, maternal grandfather, maternal side - type 2     She was diagnosed with Graves' disease in 2014.  This has since been in remission.      She has since been switched to the Medtronic 780G pump.  She admits to grazing and thus finds it difficult to put carbs in the pump for everything she eats.      Known complications due to diabetes included none    Cardiovascular risk factors include diabetes mellitus and hypertension. The patient is on an ACE inhibitor or angiotensin II receptor blocker.  The patient has not been previously hospitalized due to diabetic ketoacidosis.     Current symptoms/problems include none. Her clinical course has been stable.     The patient is currently checking the blood glucose multiple times per day.    Patient is using: continuous glucose monitor                                      Hypoglycemia frequency: 4%  Hypoglycemia awareness: Yes     Exercise: denies; but remains active      Review of Systems   All other systems reviewed and are negative.      Objective   /88   Pulse 77   Ht 1.619 m (5' 3.75\")   Wt 66.4 kg (146 lb 6.4 oz)   BMI 25.33 kg/m²   Physical Exam  Vitals and nursing note reviewed.   Constitutional:       General: She is not in acute distress.     Appearance: Normal appearance. She is normal weight.   HENT:      Head: Normocephalic and atraumatic.      Nose: Nose normal.      Mouth/Throat:      Mouth: Mucous membranes are moist.   Eyes:      Extraocular Movements: Extraocular movements intact.   Pulmonary:      Effort: Pulmonary effort is normal.   Musculoskeletal:         General: Normal range of motion.      Right foot: Normal " range of motion. No deformity, bunion or Charcot foot.      Left foot: Normal range of motion. No deformity, bunion or Charcot foot.   Feet:      Right foot:      Skin integrity: Skin integrity normal.      Toenail Condition: Right toenails are normal.      Left foot:      Skin integrity: Skin integrity normal.      Toenail Condition: Left toenails are normal.   Neurological:      Mental Status: She is alert and oriented to person, place, and time.      Comments: No tremors to outstretched hands     Psychiatric:         Mood and Affect: Mood normal.         Lab Review  Lab Results   Component Value Date    HGBA1C 7.8 (H) 02/14/2025     Lab Results   Component Value Date    GLUCOSE 90 01/31/2025    CALCIUM 9.2 01/31/2025     01/31/2025    K 3.8 01/31/2025    CO2 27 01/31/2025     01/31/2025    BUN 11 01/31/2025    CREATININE 0.68 01/31/2025     Lab Results   Component Value Date    CHOL 192 01/31/2025    CHOL 176 09/04/2024    CHOL 187 04/29/2024     Lab Results   Component Value Date    HDL 68 01/31/2025    HDL 70.9 09/04/2024    HDL 64.3 04/29/2024     Lab Results   Component Value Date    LDLCALC 104 (H) 01/31/2025    LDLCALC 87 09/04/2024    LDLCALC 93 04/29/2024     Lab Results   Component Value Date    TRIG 103 01/31/2025    TRIG 91 09/04/2024    TRIG 147 04/29/2024     Lab Results   Component Value Date    TSH 0.61 01/31/2025         Health Maintenance:   Foot Exam:   Eye Exam:  Urine Albumin:  September 4, 2024    CGM Interpretation  14 day CGM download was reviewed in detail as documented above and will be attached to chart.  A minimum of 72 hours of glucose data was used to inform the management plan outlined below.    Sufficient data to analyze:    9% of values is above target range, which is at goal.    87% of values is spent in target range, and thus is at goal   4% of values is spent with hypoglycemia, and thus is at goal         Assessment/Plan     40-year-old female presents for follow up  for type 1 diabetes mellitus.  Her blood pressure is at goal.    Type 1 diabetes mellitus with diabetic microalbuminuria (Multi)  To continue with the insulin pump   To continue the use of your CGM for the intensive glucose monitoring due to the dynamic nature of insulin requirements, the narrow therapeutic index of insulin and the potentially fatal consequences of treatment  Counseled that the time in range should be >70% and thus you are at goal       Insulin pump in place  To change pump sites every 7 days    For follow up in 2 months with repeat labs

## 2025-04-23 NOTE — PATIENT INSTRUCTIONS
Thank you for choosing [facility]  for your health care needs.  If you have any questions, concerns or medical needs, please feel free to contact our office at [phone].    Please ensure you complete your blood work one week before the next scheduled appointment.    To continue with the insulin pump   To continue the use of your CGM for the intensive glucose monitoring due to the dynamic nature of insulin requirements, the narrow therapeutic index of insulin and the potentially fatal consequences of treatment  Counseled that the time in range should be >70% and thus you are at goal   For follow up in 2 months with repeat labs

## 2025-04-25 ENCOUNTER — APPOINTMENT (OUTPATIENT)
Dept: ENDOCRINOLOGY | Facility: CLINIC | Age: 40
End: 2025-04-25
Payer: COMMERCIAL

## 2025-04-25 VITALS
BODY MASS INDEX: 25 KG/M2 | DIASTOLIC BLOOD PRESSURE: 88 MMHG | HEIGHT: 64 IN | SYSTOLIC BLOOD PRESSURE: 132 MMHG | HEART RATE: 77 BPM | WEIGHT: 146.4 LBS

## 2025-04-25 DIAGNOSIS — R80.9 TYPE 1 DIABETES MELLITUS WITH DIABETIC MICROALBUMINURIA (MULTI): Primary | ICD-10-CM

## 2025-04-25 DIAGNOSIS — Z96.41 INSULIN PUMP IN PLACE: ICD-10-CM

## 2025-04-25 DIAGNOSIS — E10.29 TYPE 1 DIABETES MELLITUS WITH DIABETIC MICROALBUMINURIA (MULTI): Primary | ICD-10-CM

## 2025-04-25 PROCEDURE — 3008F BODY MASS INDEX DOCD: CPT | Performed by: INTERNAL MEDICINE

## 2025-04-25 PROCEDURE — 95251 CONT GLUC MNTR ANALYSIS I&R: CPT | Performed by: INTERNAL MEDICINE

## 2025-04-25 PROCEDURE — 3079F DIAST BP 80-89 MM HG: CPT | Performed by: INTERNAL MEDICINE

## 2025-04-25 PROCEDURE — 99213 OFFICE O/P EST LOW 20 MIN: CPT | Performed by: INTERNAL MEDICINE

## 2025-04-25 PROCEDURE — 4010F ACE/ARB THERAPY RXD/TAKEN: CPT | Performed by: INTERNAL MEDICINE

## 2025-04-25 PROCEDURE — 3075F SYST BP GE 130 - 139MM HG: CPT | Performed by: INTERNAL MEDICINE

## 2025-04-27 RX ORDER — INSULIN LISPRO 100 [IU]/ML
INJECTION, SOLUTION INTRAVENOUS; SUBCUTANEOUS
Qty: 30 ML | Refills: 5 | Status: SHIPPED | OUTPATIENT
Start: 2025-04-27

## 2025-04-27 NOTE — ASSESSMENT & PLAN NOTE
To continue with the insulin pump   To continue the use of your CGM for the intensive glucose monitoring due to the dynamic nature of insulin requirements, the narrow therapeutic index of insulin and the potentially fatal consequences of treatment  Counseled that the time in range should be >70% and thus you are at goal

## 2025-05-19 ENCOUNTER — APPOINTMENT (OUTPATIENT)
Dept: OBSTETRICS AND GYNECOLOGY | Facility: CLINIC | Age: 40
End: 2025-05-19
Payer: COMMERCIAL

## 2025-05-19 VITALS — BODY MASS INDEX: 24.15 KG/M2 | DIASTOLIC BLOOD PRESSURE: 92 MMHG | SYSTOLIC BLOOD PRESSURE: 136 MMHG | WEIGHT: 139.6 LBS

## 2025-05-19 DIAGNOSIS — N93.9 ABNORMAL UTERINE BLEEDING: Primary | ICD-10-CM

## 2025-05-19 DIAGNOSIS — Z12.4 SCREENING FOR MALIGNANT NEOPLASM OF CERVIX: ICD-10-CM

## 2025-05-19 DIAGNOSIS — Z11.51 SCREENING FOR HUMAN PAPILLOMAVIRUS (HPV): ICD-10-CM

## 2025-05-19 PROCEDURE — 87626 HPV SEP HI-RSK TYP&POOL RSLT: CPT

## 2025-05-19 PROCEDURE — 99213 OFFICE O/P EST LOW 20 MIN: CPT | Performed by: STUDENT IN AN ORGANIZED HEALTH CARE EDUCATION/TRAINING PROGRAM

## 2025-05-19 PROCEDURE — 88142 CYTOPATH C/V THIN LAYER: CPT

## 2025-05-19 PROCEDURE — 4010F ACE/ARB THERAPY RXD/TAKEN: CPT | Performed by: STUDENT IN AN ORGANIZED HEALTH CARE EDUCATION/TRAINING PROGRAM

## 2025-05-19 PROCEDURE — 3075F SYST BP GE 130 - 139MM HG: CPT | Performed by: STUDENT IN AN ORGANIZED HEALTH CARE EDUCATION/TRAINING PROGRAM

## 2025-05-19 PROCEDURE — 3080F DIAST BP >= 90 MM HG: CPT | Performed by: STUDENT IN AN ORGANIZED HEALTH CARE EDUCATION/TRAINING PROGRAM

## 2025-05-19 NOTE — PROGRESS NOTES
Subjective   Patient ID: Arlene Moore is a 40 y.o. female who presents for est patient gyn visit (Menses almost continuous worsening after intercourse 3 months).  She is having irregular periods. She will start bleeding after intercourse. She will bleed 5 days at a time, then get a few days with no bleeding. She will go through a super sized tampon every few hours. If she has intercourse, she will start bleeding again. She has some vasomotor symptoms as well. She had a fan at work and by her bed. This went on for the past year. She would have >5 hot flashes at a time. She will get intermittently overheated. She was recently diagnosed with DM1.    She currently takes effexor. She has cHTN as well-stable on Lisinopril and Norvasc.    No fevers, chills. No HA/vision changes. No RUQ pain, n/v. No chest pain or SOB. No calf pain.              Review of Systems   All other systems reviewed and are negative.      Medical History[1]  Surgical History[2]  Social History     Socioeconomic History    Marital status:      Spouse name: Not on file    Number of children: Not on file    Years of education: Not on file    Highest education level: Not on file   Occupational History    Occupation:  [employer]   Tobacco Use    Smoking status: Every Day     Current packs/day: 0.50     Types: Cigarettes    Smokeless tobacco: Never   Vaping Use    Vaping status: Former    Substances: Nicotine    Devices: Disposable, Pre-filled pod   Substance and Sexual Activity    Alcohol use: Not Currently    Drug use: Yes     Types: Marijuana     Comment: occasioanlly    Sexual activity: Yes     Partners: Male     Birth control/protection: Male Sterilization     Comment: vasectomy   Other Topics Concern    Not on file   Social History Narrative    Not on file     Social Drivers of Health     Financial Resource Strain: Not on file   Food Insecurity: Not on file   Transportation Needs: Not on file   Physical  Activity: Not on file   Stress: Not on file   Social Connections: Not on file   Intimate Partner Violence: Not on file   Housing Stability: Not on file       Objective   Physical Exam  General: A&Ox3  Head: Normocephalic, atraumatic  Heart/Lungs: RRR, No murmurs, gallops, or rubs. Lungs are CTAB.  Abdomen: Soft, nontender. BS+4. No bruising or masses.  Genitourinary: Labia and vagina normal in appearance. Uterus is small, mobile, anteverted. No adnexal masses palpated.  Significant cervical friability noted. No abnormal lesions. Stopped with silver nitrate.  Lower Extremities: No lower extremity Edema no palpable cords.     A chaperone, Daniela Hill, was present for the exam.    Assessment/Plan   Problem List Items Addressed This Visit       Abnormal uterine bleeding - Primary    Overview   Patient has heavier menstrual bleeding triggered by intercourse.  TVUS ordered. Labwork ordered.  Follow up with results.  Of note, increased friability of cervix, stopped with silver nitrate. No obvious abnormality. Pap reviewed, wnl.          Relevant Orders    TSH with reflex to Free T4 if abnormal    Thyroid Peroxidase (TPO) Antibody    Thyrotropin Receptor Antibody    Thyroid Stimulating Immunoglobulin    17-Hydroxyprogesterone    Estradiol    Follicle Stimulating Hormone    Testosterone,Free and Total    DHEA-Sulfate    Prolactin    CBC    Comprehensive Metabolic Panel    US PELVIS TRANSABDOMINAL WITH TRANSVAGINAL     Other Visit Diagnoses         Screening for malignant neoplasm of cervix        Relevant Orders    THINPREP PAP      Screening for human papillomavirus (HPV)        Relevant Orders    THINPREP PAP            Jez Zapata MD 05/19/25 4:56 PM          [1]   Past Medical History:  Diagnosis Date    Attention-deficit hyperactivity disorder, predominantly hyperactive type 12/06/2022    ADHD (attention deficit hyperactivity disorder), predominantly hyperactive impulsive type    Type 1 diabetes (Multi)    [2]    Past Surgical History:  Procedure Laterality Date    OTHER SURGICAL HISTORY  12/19/2019    Loop electrosurgical excision procedure    OTHER SURGICAL HISTORY  12/19/2019    Appendectomy

## 2025-05-20 ENCOUNTER — HOSPITAL ENCOUNTER (OUTPATIENT)
Dept: RADIOLOGY | Facility: HOSPITAL | Age: 40
Discharge: HOME | End: 2025-05-20
Payer: COMMERCIAL

## 2025-05-20 DIAGNOSIS — N93.9 ABNORMAL UTERINE BLEEDING: ICD-10-CM

## 2025-05-20 PROCEDURE — 76856 US EXAM PELVIC COMPLETE: CPT | Performed by: RADIOLOGY

## 2025-05-20 PROCEDURE — 76830 TRANSVAGINAL US NON-OB: CPT | Performed by: RADIOLOGY

## 2025-05-20 PROCEDURE — 76856 US EXAM PELVIC COMPLETE: CPT

## 2025-05-23 LAB
17OHP SERPL-MCNC: NORMAL NG/DL
ALBUMIN SERPL-MCNC: 4.7 G/DL (ref 3.6–5.1)
ALP SERPL-CCNC: 63 U/L (ref 31–125)
ALT SERPL-CCNC: 19 U/L (ref 6–29)
ANION GAP SERPL CALCULATED.4IONS-SCNC: 7 MMOL/L (CALC) (ref 7–17)
AST SERPL-CCNC: 20 U/L (ref 10–30)
BILIRUB SERPL-MCNC: 0.5 MG/DL (ref 0.2–1.2)
BUN SERPL-MCNC: 10 MG/DL (ref 7–25)
CALCIUM SERPL-MCNC: 9 MG/DL (ref 8.6–10.2)
CHLORIDE SERPL-SCNC: 105 MMOL/L (ref 98–110)
CO2 SERPL-SCNC: 28 MMOL/L (ref 20–32)
CREAT SERPL-MCNC: 0.78 MG/DL (ref 0.5–0.99)
DHEA-S SERPL-MCNC: 99 MCG/DL (ref 15–205)
EGFRCR SERPLBLD CKD-EPI 2021: 98 ML/MIN/1.73M2
ERYTHROCYTE [DISTWIDTH] IN BLOOD BY AUTOMATED COUNT: 12.2 % (ref 11–15)
ESTRADIOL SERPL-MCNC: 131 PG/ML
FSH SERPL-ACNC: 6.9 MIU/ML
GLUCOSE SERPL-MCNC: 139 MG/DL (ref 65–99)
HCT VFR BLD AUTO: 40.6 % (ref 35–45)
HGB BLD-MCNC: 13.1 G/DL (ref 11.7–15.5)
MCH RBC QN AUTO: 30 PG (ref 27–33)
MCHC RBC AUTO-ENTMCNC: 32.3 G/DL (ref 32–36)
MCV RBC AUTO: 93.1 FL (ref 80–100)
PLATELET # BLD AUTO: 254 THOUSAND/UL (ref 140–400)
PMV BLD REES-ECKER: 11.6 FL (ref 7.5–12.5)
POTASSIUM SERPL-SCNC: 3.1 MMOL/L (ref 3.5–5.3)
PROLACTIN SERPL-MCNC: 7.9 NG/ML
PROT SERPL-MCNC: 7.4 G/DL (ref 6.1–8.1)
RBC # BLD AUTO: 4.36 MILLION/UL (ref 3.8–5.1)
SODIUM SERPL-SCNC: 140 MMOL/L (ref 135–146)
TESTOST FREE SERPL-MCNC: 1.7 PG/ML (ref 0.1–6.4)
TESTOST SERPL-MCNC: 24 NG/DL (ref 2–45)
THYROPEROXIDASE AB SERPL-ACNC: 171 IU/ML
TSH BII SER-ACNC: <1 IU/L
TSH SERPL-ACNC: 0.83 MIU/L
TSI SER-ACNC: 108 % BASELINE
WBC # BLD AUTO: 5.5 THOUSAND/UL (ref 3.8–10.8)

## 2025-05-26 LAB
17OHP SERPL-MCNC: 28 NG/DL
ALBUMIN SERPL-MCNC: 4.7 G/DL (ref 3.6–5.1)
ALP SERPL-CCNC: 63 U/L (ref 31–125)
ALT SERPL-CCNC: 19 U/L (ref 6–29)
ANION GAP SERPL CALCULATED.4IONS-SCNC: 7 MMOL/L (CALC) (ref 7–17)
AST SERPL-CCNC: 20 U/L (ref 10–30)
BILIRUB SERPL-MCNC: 0.5 MG/DL (ref 0.2–1.2)
BUN SERPL-MCNC: 10 MG/DL (ref 7–25)
CALCIUM SERPL-MCNC: 9 MG/DL (ref 8.6–10.2)
CHLORIDE SERPL-SCNC: 105 MMOL/L (ref 98–110)
CO2 SERPL-SCNC: 28 MMOL/L (ref 20–32)
CREAT SERPL-MCNC: 0.78 MG/DL (ref 0.5–0.99)
DHEA-S SERPL-MCNC: 99 MCG/DL (ref 15–205)
EGFRCR SERPLBLD CKD-EPI 2021: 98 ML/MIN/1.73M2
ERYTHROCYTE [DISTWIDTH] IN BLOOD BY AUTOMATED COUNT: 12.2 % (ref 11–15)
ESTRADIOL SERPL-MCNC: 131 PG/ML
FSH SERPL-ACNC: 6.9 MIU/ML
GLUCOSE SERPL-MCNC: 139 MG/DL (ref 65–99)
HCT VFR BLD AUTO: 40.6 % (ref 35–45)
HGB BLD-MCNC: 13.1 G/DL (ref 11.7–15.5)
MCH RBC QN AUTO: 30 PG (ref 27–33)
MCHC RBC AUTO-ENTMCNC: 32.3 G/DL (ref 32–36)
MCV RBC AUTO: 93.1 FL (ref 80–100)
PLATELET # BLD AUTO: 254 THOUSAND/UL (ref 140–400)
PMV BLD REES-ECKER: 11.6 FL (ref 7.5–12.5)
POTASSIUM SERPL-SCNC: 3.1 MMOL/L (ref 3.5–5.3)
PROLACTIN SERPL-MCNC: 7.9 NG/ML
PROT SERPL-MCNC: 7.4 G/DL (ref 6.1–8.1)
RBC # BLD AUTO: 4.36 MILLION/UL (ref 3.8–5.1)
SODIUM SERPL-SCNC: 140 MMOL/L (ref 135–146)
TESTOST FREE SERPL-MCNC: 1.7 PG/ML (ref 0.1–6.4)
TESTOST SERPL-MCNC: 24 NG/DL (ref 2–45)
THYROPEROXIDASE AB SERPL-ACNC: 171 IU/ML
TSH BII SER-ACNC: <1 IU/L
TSH SERPL-ACNC: 0.83 MIU/L
TSI SER-ACNC: 108 % BASELINE
WBC # BLD AUTO: 5.5 THOUSAND/UL (ref 3.8–10.8)

## 2025-06-02 ENCOUNTER — TELEPHONE (OUTPATIENT)
Dept: OBSTETRICS AND GYNECOLOGY | Facility: CLINIC | Age: 40
End: 2025-06-02
Payer: COMMERCIAL

## 2025-06-02 LAB
CYTOLOGY CMNT CVX/VAG CYTO-IMP: NORMAL
HPV HR 12 DNA GENITAL QL NAA+PROBE: NEGATIVE
HPV HR GENOTYPES PNL CVX NAA+PROBE: NEGATIVE
HPV16 DNA SPEC QL NAA+PROBE: NEGATIVE
HPV18 DNA SPEC QL NAA+PROBE: NEGATIVE
LAB AP HPV GENOTYPE QUESTION: YES
LAB AP HPV HR: NORMAL
LABORATORY COMMENT REPORT: NORMAL
LABORATORY COMMENT REPORT: NORMAL
LMP START DATE: NORMAL
PATH REPORT.TOTAL CANCER: NORMAL

## 2025-06-16 ENCOUNTER — APPOINTMENT (OUTPATIENT)
Dept: OBSTETRICS AND GYNECOLOGY | Facility: CLINIC | Age: 40
End: 2025-06-16
Payer: COMMERCIAL

## 2025-06-16 VITALS — BODY MASS INDEX: 24.05 KG/M2 | WEIGHT: 139 LBS

## 2025-06-16 DIAGNOSIS — N83.209 CYST OF OVARY, UNSPECIFIED LATERALITY: ICD-10-CM

## 2025-06-16 DIAGNOSIS — N93.9 ABNORMAL UTERINE BLEEDING: Primary | ICD-10-CM

## 2025-06-16 PROCEDURE — 4010F ACE/ARB THERAPY RXD/TAKEN: CPT | Performed by: STUDENT IN AN ORGANIZED HEALTH CARE EDUCATION/TRAINING PROGRAM

## 2025-06-16 PROCEDURE — 99213 OFFICE O/P EST LOW 20 MIN: CPT | Performed by: STUDENT IN AN ORGANIZED HEALTH CARE EDUCATION/TRAINING PROGRAM

## 2025-06-16 NOTE — PROGRESS NOTES
Subjective   Patient ID: Arlene Moore is a 40 y.o. female who presents for Results (US 5-20-25 and labs /Bleeding stopped for now).  She is having irregular periods. She will start bleeding after intercourse. She will bleed 5 days at a time, then get a few days with no bleeding. She will go through a super sized tampon every few hours. If she has intercourse, she will start bleeding again. She has some vasomotor symptoms as well. She had a fan at work and by her bed. This went on for the past year. She would have >5 hot flashes at a time. She will get intermittently overheated. She was recently diagnosed with DM1.    She currently takes effexor. She has cHTN as well-stable on Lisinopril and Norvasc.    Her bleeding has stopped for now.    No fevers, chills. No HA/vision changes. No RUQ pain, n/v. No chest pain or SOB. No calf pain.              Review of Systems   All other systems reviewed and are negative.      Medical History[1]  Surgical History[2]  Social History     Socioeconomic History    Marital status:      Spouse name: Not on file    Number of children: Not on file    Years of education: Not on file    Highest education level: Not on file   Occupational History    Occupation:  [employer]   Tobacco Use    Smoking status: Every Day     Current packs/day: 0.50     Types: Cigarettes    Smokeless tobacco: Never   Vaping Use    Vaping status: Former    Substances: Nicotine    Devices: Disposable, Pre-filled pod   Substance and Sexual Activity    Alcohol use: Not Currently    Drug use: Yes     Types: Marijuana     Comment: occasioanlly    Sexual activity: Yes     Partners: Male     Birth control/protection: Male Sterilization     Comment: vasectomy   Other Topics Concern    Not on file   Social History Narrative    Not on file     Social Drivers of Health     Financial Resource Strain: Not on file   Food Insecurity: Not on file   Transportation Needs: Not on file   Physical  Activity: Not on file   Stress: Not on file   Social Connections: Not on file   Intimate Partner Violence: Not on file   Housing Stability: Not on file       Objective   Physical Exam  General: A&Ox3  Head: Normocephalic, atraumatic  Heart/Lungs: RRR, No murmurs, gallops, or rubs. Lungs are CTAB.  Abdomen: Soft, nontender. BS+4. No bruising or masses.  Genitourinary: Labia and vagina normal in appearance. Uterus is small, mobile, anteverted. No adnexal masses palpated.  Significant cervical friability noted. No abnormal lesions. Stopped with silver nitrate.  Lower Extremities: No lower extremity Edema no palpable cords.     A chaperone, Daniela Hill, was present for the exam.    Assessment/Plan   Problem List Items Addressed This Visit       Abnormal uterine bleeding - Primary    Overview   Patient has heavier menstrual bleeding triggered by intercourse.  TVUS shows possible polyp. Given persistence of bleeding and no obvious other etiology, endometrium should be evaluated. Plan for Hysteroscopy, D&C, possible Myosure.  TSH normal, but TPO positive.   Pap reviewed, wnl.          Relevant Orders    Case Request Operating Room: HYSTEROSCOPY, WITH UTERINE MYOMECTOMY (Completed)     Other Visit Diagnoses         Cyst of ovary, unspecified laterality        Relevant Orders    US PELVIS TRANSABDOMINAL WITH TRANSVAGINAL            Jez Zapata MD 06/16/25 6:13 PM          [1]   Past Medical History:  Diagnosis Date    Attention-deficit hyperactivity disorder, predominantly hyperactive type 12/06/2022    ADHD (attention deficit hyperactivity disorder), predominantly hyperactive impulsive type    Type 1 diabetes (Multi)    [2]   Past Surgical History:  Procedure Laterality Date    OTHER SURGICAL HISTORY  12/19/2019    Loop electrosurgical excision procedure    OTHER SURGICAL HISTORY  12/19/2019    Appendectomy

## 2025-06-26 ENCOUNTER — APPOINTMENT (OUTPATIENT)
Dept: PRIMARY CARE | Facility: CLINIC | Age: 40
End: 2025-06-26
Payer: COMMERCIAL

## 2025-06-26 VITALS
WEIGHT: 133 LBS | HEIGHT: 64 IN | HEART RATE: 92 BPM | DIASTOLIC BLOOD PRESSURE: 90 MMHG | BODY MASS INDEX: 22.71 KG/M2 | SYSTOLIC BLOOD PRESSURE: 132 MMHG

## 2025-06-26 DIAGNOSIS — E10.29 TYPE 1 DIABETES MELLITUS WITH DIABETIC MICROALBUMINURIA (MULTI): ICD-10-CM

## 2025-06-26 DIAGNOSIS — I10 PRIMARY HYPERTENSION: ICD-10-CM

## 2025-06-26 DIAGNOSIS — E05.00 GRAVES DISEASE: ICD-10-CM

## 2025-06-26 DIAGNOSIS — R80.9 TYPE 1 DIABETES MELLITUS WITH DIABETIC MICROALBUMINURIA (MULTI): ICD-10-CM

## 2025-06-26 DIAGNOSIS — F33.2 SEVERE EPISODE OF RECURRENT MAJOR DEPRESSIVE DISORDER, WITHOUT PSYCHOTIC FEATURES (MULTI): Primary | ICD-10-CM

## 2025-06-26 PROCEDURE — 3075F SYST BP GE 130 - 139MM HG: CPT | Performed by: INTERNAL MEDICINE

## 2025-06-26 PROCEDURE — 3080F DIAST BP >= 90 MM HG: CPT | Performed by: INTERNAL MEDICINE

## 2025-06-26 PROCEDURE — 99215 OFFICE O/P EST HI 40 MIN: CPT | Performed by: INTERNAL MEDICINE

## 2025-06-26 PROCEDURE — 3008F BODY MASS INDEX DOCD: CPT | Performed by: INTERNAL MEDICINE

## 2025-06-26 PROCEDURE — 4010F ACE/ARB THERAPY RXD/TAKEN: CPT | Performed by: INTERNAL MEDICINE

## 2025-06-26 RX ORDER — ARIPIPRAZOLE 5 MG/1
5 TABLET ORAL NIGHTLY
Qty: 30 TABLET | Refills: 0 | Status: SHIPPED | OUTPATIENT
Start: 2025-06-26 | End: 2025-07-26

## 2025-06-26 RX ORDER — BLOOD-GLUCOSE SENSOR
EACH MISCELLANEOUS
COMMUNITY

## 2025-06-26 RX ORDER — BLOOD-GLUCOSE TRANSMITTER
EACH MISCELLANEOUS AS NEEDED
COMMUNITY

## 2025-06-26 ASSESSMENT — PATIENT HEALTH QUESTIONNAIRE - PHQ9
SUM OF ALL RESPONSES TO PHQ9 QUESTIONS 1 AND 2: 6
1. LITTLE INTEREST OR PLEASURE IN DOING THINGS: NEARLY EVERY DAY
7. TROUBLE CONCENTRATING ON THINGS, SUCH AS READING THE NEWSPAPER OR WATCHING TELEVISION: NEARLY EVERY DAY
SUM OF ALL RESPONSES TO PHQ QUESTIONS 1-9: 21
3. TROUBLE FALLING OR STAYING ASLEEP OR SLEEPING TOO MUCH: NEARLY EVERY DAY
4. FEELING TIRED OR HAVING LITTLE ENERGY: NEARLY EVERY DAY
8. MOVING OR SPEAKING SO SLOWLY THAT OTHER PEOPLE COULD HAVE NOTICED. OR THE OPPOSITE, BEING SO FIGETY OR RESTLESS THAT YOU HAVE BEEN MOVING AROUND A LOT MORE THAN USUAL: NOT AT ALL
6. FEELING BAD ABOUT YOURSELF - OR THAT YOU ARE A FAILURE OR HAVE LET YOURSELF OR YOUR FAMILY DOWN: NEARLY EVERY DAY
9. THOUGHTS THAT YOU WOULD BE BETTER OFF DEAD, OR OF HURTING YOURSELF: NOT AT ALL
5. POOR APPETITE OR OVEREATING: NEARLY EVERY DAY
2. FEELING DOWN, DEPRESSED OR HOPELESS: NEARLY EVERY DAY

## 2025-06-26 ASSESSMENT — ANXIETY QUESTIONNAIRES
7. FEELING AFRAID AS IF SOMETHING AWFUL MIGHT HAPPEN: NEARLY EVERY DAY
5. BEING SO RESTLESS THAT IT IS HARD TO SIT STILL: NOT AT ALL
6. BECOMING EASILY ANNOYED OR IRRITABLE: NEARLY EVERY DAY
4. TROUBLE RELAXING: NEARLY EVERY DAY
IF YOU CHECKED OFF ANY PROBLEMS ON THIS QUESTIONNAIRE, HOW DIFFICULT HAVE THESE PROBLEMS MADE IT FOR YOU TO DO YOUR WORK, TAKE CARE OF THINGS AT HOME, OR GET ALONG WITH OTHER PEOPLE: EXTREMELY DIFFICULT
2. NOT BEING ABLE TO STOP OR CONTROL WORRYING: NEARLY EVERY DAY
GAD7 TOTAL SCORE: 16
1. FEELING NERVOUS, ANXIOUS, OR ON EDGE: SEVERAL DAYS
3. WORRYING TOO MUCH ABOUT DIFFERENT THINGS: NEARLY EVERY DAY

## 2025-06-26 ASSESSMENT — COLUMBIA-SUICIDE SEVERITY RATING SCALE - C-SSRS
6. HAVE YOU EVER DONE ANYTHING, STARTED TO DO ANYTHING, OR PREPARED TO DO ANYTHING TO END YOUR LIFE?: NO
1. IN THE PAST MONTH, HAVE YOU WISHED YOU WERE DEAD OR WISHED YOU COULD GO TO SLEEP AND NOT WAKE UP?: NO
2. HAVE YOU ACTUALLY HAD ANY THOUGHTS OF KILLING YOURSELF?: NO

## 2025-06-26 ASSESSMENT — ENCOUNTER SYMPTOMS
DYSPHORIC MOOD: 1
NERVOUS/ANXIOUS: 1

## 2025-06-26 NOTE — PROGRESS NOTES
"Subjective   Patient ID: Arlene Moore is a 40 y.o. female who presents for Follow-up and Depression.    HPI     Review of Systems    Objective   /90   Pulse 92   Ht 1.619 m (5' 3.75\")   Wt 60.3 kg (133 lb)   BMI 23.01 kg/m²     Physical Exam    Assessment/Plan          "

## 2025-06-26 NOTE — PROGRESS NOTES
"Subjective   Arlene Moore is a 40 y.o. female who presents for follow-up of Type 2 diabetes mellitus. The initial diagnosis of diabetes was made over a year ago. The patient does nothave a known family history of diabetes.    Known complications due to diabetes included microalbuminuria    Cardiovascular risk factors include diabetes mellitus, hypertension, and microalbuminuria. The patient is on an ACE inhibitor or angiotensin II receptor blocker.  The patient has not been previously hospitalized due to diabetic ketoacidosis.     Current symptoms/problems include none. Her clinical course has fluctuated.     Current diabetes regimen is as follows: Iinsulin pump: Basal: Humalog  I/C:    Insulin on board:    ISF:     TDD:    TDD basal:    Target glucose: 5853-5105    The patient is currently checking the blood glucose 4 times per day.    Patient is using: continuous glucose monitor    Hypoglycemia frequency: y  Hypoglycemia awareness: Yes     Exercise: daily   Meal panning: She is using carbohydrate counting.    Review of Systems   Psychiatric/Behavioral:  Positive for dysphoric mood. The patient is nervous/anxious.    All other systems reviewed and are negative.      Objective   /90   Pulse 92   Ht 1.619 m (5' 3.75\")   Wt 60.3 kg (133 lb)   BMI 23.01 kg/m²   Physical Exam  Vitals and nursing note reviewed.   Constitutional:       General: She is not in acute distress.     Appearance: Normal appearance. She is well-developed. She is not toxic-appearing.   HENT:      Head: Normocephalic and atraumatic.      Right Ear: Tympanic membrane and external ear normal.      Left Ear: Tympanic membrane and external ear normal.      Nose: Nose normal.      Mouth/Throat:      Mouth: Mucous membranes are moist.      Pharynx: Oropharynx is clear. No oropharyngeal exudate or posterior oropharyngeal erythema.      Tonsils: No tonsillar exudate. 2+ on the right. 2+ on the left.   Eyes:      Extraocular Movements: " Extraocular movements intact.      Conjunctiva/sclera: Conjunctivae normal.   Cardiovascular:      Rate and Rhythm: Normal rate and regular rhythm.      Pulses: Normal pulses.      Heart sounds: Normal heart sounds. No murmur heard.  Pulmonary:      Effort: Pulmonary effort is normal.      Breath sounds: Normal breath sounds.   Abdominal:      General: Abdomen is flat. Bowel sounds are normal.      Palpations: Abdomen is soft.   Musculoskeletal:      Cervical back: Neck supple.   Lymphadenopathy:      Cervical: No cervical adenopathy.   Skin:     General: Skin is warm and dry.      Findings: No rash.   Neurological:      Mental Status: She is alert. Mental status is at baseline.   Psychiatric:         Mood and Affect: Mood normal.         Behavior: Behavior normal.         Thought Content: Thought content normal.         Judgment: Judgment normal.         Lab Review  GLUCOSE (mg/dL)   Date Value   05/19/2025 139 (H)   01/31/2025 90     Glucose (mg/dL)   Date Value   09/04/2024 99   04/29/2024 258 (H)   12/05/2022 79   02/07/2022 127 (H)   02/01/2022 104 (H)     HEMOGLOBIN A1c (% of total Hgb)   Date Value   02/14/2025 7.8 (H)     Hemoglobin A1C (%)   Date Value   09/04/2024 6.9 (H)   04/29/2024 10.1 (H)     CARBON DIOXIDE (mmol/L)   Date Value   05/19/2025 28   01/31/2025 27     Bicarbonate (mmol/L)   Date Value   09/04/2024 26   04/29/2024 27   12/05/2022 27   02/07/2022 29   02/01/2022 28     UREA NITROGEN (BUN) (mg/dL)   Date Value   05/19/2025 10   01/31/2025 11     Urea Nitrogen (mg/dL)   Date Value   09/04/2024 12   04/29/2024 7   12/05/2022 15   02/07/2022 18   02/01/2022 22     Creatinine (mg/dL)   Date Value   09/04/2024 0.72   04/29/2024 0.79   12/05/2022 0.83   02/07/2022 1.07 (H)   02/01/2022 1.07 (H)     CREATININE (mg/dL)   Date Value   05/19/2025 0.78   01/31/2025 0.68       Health Maintenance:   Foot Exam:   Eye Exam:  Lipid Panel:  Urine Albumin:    Assessment/Plan   Diagnoses and all orders for  this visit:  Severe episode of recurrent major depressive disorder, without psychotic features (Multi)  Patient with written down summary of symptoms over the past 6 to 8 weeks worsening feelings of depression and anxiety with feelings of being overwhelmed emotional lows and highs come in waves recent thoughts of burst of energy were affecting sleep and having difficulty following through on projects at home at work severe fatigue feeling disconnected poor appetite especially with episodes of loneliness or being alone sometimes feeling sad she does not believe she matters tired of fighting insurance of medical [employer] for supplies feels been less than exhausting often crying feeling numb living related cannot get out of feelings of suffocation and panic at times increased for forgetfulness and inattentiveness family ends domestic life is stable still has fear of being alone  -     ARIPiprazole (Abilify) 5 mg tablet; Take 1 tablet (5 mg) by mouth once daily at bedtime.  -     Tsh With Reflex To Free T4 If Abnormal; Future  -     T4, free; Future  -     T3, free; Future  -     Referral to Psychiatry; Future  Follow-up in 1 week continue venlafaxine 300 mg daily  Type 1 diabetes mellitus with diabetic microalbuminuria (Multi)  -     Follow Up In [facility] - PCP - Established  Graves disease  -     ARIPiprazole (Abilify) 5 mg tablet; Take 1 tablet (5 mg) by mouth once daily at bedtime.  -     Tsh With Reflex To Free T4 If Abnormal; Future  -     T4, free; Future  -     T3, free; Future  -     Referral to Psychiatry; Future  Primary hypertension  Blood pressure mildly elevated but stable on lisinopril 40 mg dailyTo the left left, which is monitoring closely will attempt to improve overall depression and anxiety feelings at this time will avoid over treatment         I h     RX changes: none   Education:  complications of diabetes mellitus, hypoglycemia prevention and treatment, self-monitoring of blood  glucose skills, nutrition, and self-injection of insulin

## 2025-07-10 ENCOUNTER — OFFICE VISIT (OUTPATIENT)
Dept: PRIMARY CARE | Facility: CLINIC | Age: 40
End: 2025-07-10
Payer: COMMERCIAL

## 2025-07-10 VITALS
HEART RATE: 68 BPM | HEIGHT: 64 IN | SYSTOLIC BLOOD PRESSURE: 132 MMHG | DIASTOLIC BLOOD PRESSURE: 90 MMHG | BODY MASS INDEX: 23.05 KG/M2 | WEIGHT: 135 LBS

## 2025-07-10 DIAGNOSIS — Z12.31 SCREENING MAMMOGRAM FOR BREAST CANCER: ICD-10-CM

## 2025-07-10 DIAGNOSIS — R80.9 TYPE 1 DIABETES MELLITUS WITH DIABETIC MICROALBUMINURIA (MULTI): ICD-10-CM

## 2025-07-10 DIAGNOSIS — F33.2 SEVERE EPISODE OF RECURRENT MAJOR DEPRESSIVE DISORDER, WITHOUT PSYCHOTIC FEATURES (MULTI): Primary | ICD-10-CM

## 2025-07-10 DIAGNOSIS — E10.29 TYPE 1 DIABETES MELLITUS WITH DIABETIC MICROALBUMINURIA (MULTI): ICD-10-CM

## 2025-07-10 DIAGNOSIS — E05.00 GRAVES DISEASE: ICD-10-CM

## 2025-07-10 DIAGNOSIS — I10 PRIMARY HYPERTENSION: ICD-10-CM

## 2025-07-10 PROCEDURE — 3075F SYST BP GE 130 - 139MM HG: CPT | Performed by: INTERNAL MEDICINE

## 2025-07-10 PROCEDURE — 3008F BODY MASS INDEX DOCD: CPT | Performed by: INTERNAL MEDICINE

## 2025-07-10 PROCEDURE — 3080F DIAST BP >= 90 MM HG: CPT | Performed by: INTERNAL MEDICINE

## 2025-07-10 PROCEDURE — 99213 OFFICE O/P EST LOW 20 MIN: CPT | Performed by: INTERNAL MEDICINE

## 2025-07-10 PROCEDURE — 4010F ACE/ARB THERAPY RXD/TAKEN: CPT | Performed by: INTERNAL MEDICINE

## 2025-07-10 ASSESSMENT — PATIENT HEALTH QUESTIONNAIRE - PHQ9
1. LITTLE INTEREST OR PLEASURE IN DOING THINGS: SEVERAL DAYS
2. FEELING DOWN, DEPRESSED OR HOPELESS: SEVERAL DAYS
SUM OF ALL RESPONSES TO PHQ9 QUESTIONS 1 AND 2: 2

## 2025-07-10 ASSESSMENT — ANXIETY QUESTIONNAIRES
5. BEING SO RESTLESS THAT IT IS HARD TO SIT STILL: NEARLY EVERY DAY
4. TROUBLE RELAXING: NEARLY EVERY DAY
1. FEELING NERVOUS, ANXIOUS, OR ON EDGE: NEARLY EVERY DAY
6. BECOMING EASILY ANNOYED OR IRRITABLE: NEARLY EVERY DAY
3. WORRYING TOO MUCH ABOUT DIFFERENT THINGS: NEARLY EVERY DAY
7. FEELING AFRAID AS IF SOMETHING AWFUL MIGHT HAPPEN: MORE THAN HALF THE DAYS
GAD7 TOTAL SCORE: 18
2. NOT BEING ABLE TO STOP OR CONTROL WORRYING: SEVERAL DAYS
IF YOU CHECKED OFF ANY PROBLEMS ON THIS QUESTIONNAIRE, HOW DIFFICULT HAVE THESE PROBLEMS MADE IT FOR YOU TO DO YOUR WORK, TAKE CARE OF THINGS AT HOME, OR GET ALONG WITH OTHER PEOPLE: VERY DIFFICULT

## 2025-07-10 NOTE — PROGRESS NOTES
"Subjective   Patient ID: Arlene Moore is a 40 y.o. female who presents for Follow-up (Abilify states it seems to be helping still having depression/anxiety ).    HPI     Review of Systems    Objective   /90   Pulse 68   Ht 1.619 m (5' 3.75\")   Wt 61.2 kg (135 lb)   LMP 06/03/2025 (Approximate)   BMI 23.35 kg/m²     Physical Exam    Assessment/Plan          "

## 2025-07-15 ENCOUNTER — ANESTHESIA EVENT (OUTPATIENT)
Dept: OPERATING ROOM | Facility: HOSPITAL | Age: 40
End: 2025-07-15
Payer: COMMERCIAL

## 2025-07-17 NOTE — PATIENT INSTRUCTIONS
Thank you for choosing [facility]  for your health care needs.  If you have any questions, concerns or medical needs, please feel free to contact our office at [phone].    Please ensure you complete your blood work one week before the next scheduled appointment.    To continue with the insulin pump   To continue the use of your CGM for the intensive glucose monitoring due to the dynamic nature of insulin requirements, the narrow therapeutic index of insulin and the potentially fatal consequences of treatment  Counseled that the time in range should be >70% and thus you are at goal   Use temp basal mode at 70%; please start 4 hours prior to surgery start time   You might have Meniere's disease    ON SURGERY DAY:  4 hours before start time:  Main menu  Insulin   Basal  Preset Temp  Select---> Begin    ONCE SURGERY IS OVER:  Main menu  Insulin  Cancel temp basal   Confirm cancel temp basal     For follow up in 4 months with repeat labs

## 2025-07-17 NOTE — PROGRESS NOTES
"Subjective   Arlene Moore is a 40 y.o. female who presents for follow up for Type 1 diabetes mellitus. The initial diagnosis of diabetes was made in May 2024. The patient’s diagnosis of diabetes mellitus was based on routine blood tests.  The patient does have a known family history of diabetes. - paternal grandmother, mother, father, maternal grandfather, maternal side - type 2     She was diagnosed with Graves' disease in 2014.  This has since been in remission.      She was experiencing postcoital bleeding.  She is for a D&C in 4 days.    She can have episodes of dizziness.  She had vertigo once in her life.  The duration can be for one hour.  Upon asking, she can also have ringing in the ears and hearing loss.      Known complications due to diabetes included none    Cardiovascular risk factors include diabetes mellitus and hypertension. The patient is on an ACE inhibitor or angiotensin II receptor blocker.  The patient has not been previously hospitalized due to diabetic ketoacidosis.     Current symptoms/problems include none. Her clinical course has been stable.     The patient is currently checking the blood glucose multiple times per day.    Patient is using: continuous glucose monitor                                        Hypoglycemia frequency: 1% (decreased from 4%)  Hypoglycemia awareness: Yes     Exercise: denies; but remains active      Review of Systems   Psychiatric/Behavioral:  The patient is nervous/anxious.         Depression        Objective   /80   Pulse 97   Ht 1.619 m (5' 3.75\")   Wt 62.6 kg (138 lb)   BMI 23.87 kg/m²   Physical Exam  Vitals and nursing note reviewed.   Constitutional:       General: She is not in acute distress.     Appearance: Normal appearance. She is normal weight.   HENT:      Head: Normocephalic and atraumatic.      Nose: Nose normal.      Mouth/Throat:      Mouth: Mucous membranes are moist.     Eyes:      Extraocular Movements: Extraocular movements " intact.       Cardiovascular:      Rate and Rhythm: Normal rate and regular rhythm.   Pulmonary:      Effort: Pulmonary effort is normal.      Breath sounds: Normal breath sounds.     Musculoskeletal:         General: Normal range of motion.      Right foot: Normal range of motion. No deformity, bunion or Charcot foot.      Left foot: Normal range of motion. No deformity, bunion or Charcot foot.   Feet:      Right foot:      Skin integrity: Skin integrity normal.      Toenail Condition: Right toenails are normal.      Left foot:      Skin integrity: Skin integrity normal.      Toenail Condition: Left toenails are normal.     Skin:     General: Skin is warm.     Neurological:      Mental Status: She is alert and oriented to person, place, and time.      Deep Tendon Reflexes: Reflexes normal.      Comments: No tremors to outstretched hands     Psychiatric:         Mood and Affect: Mood normal.         Lab Review  Lab Results   Component Value Date    HGBA1C 7.3 (A) 07/18/2025     Lab Results   Component Value Date    GLUCOSE 139 (H) 05/19/2025    CALCIUM 9.0 05/19/2025     05/19/2025    K 3.1 (L) 05/19/2025    CO2 28 05/19/2025     05/19/2025    BUN 10 05/19/2025    CREATININE 0.78 05/19/2025     Lab Results   Component Value Date    CHOL 192 01/31/2025    CHOL 176 09/04/2024    CHOL 187 04/29/2024     Lab Results   Component Value Date    HDL 68 01/31/2025    HDL 70.9 09/04/2024    HDL 64.3 04/29/2024     Lab Results   Component Value Date    LDLCALC 104 (H) 01/31/2025    LDLCALC 87 09/04/2024    LDLCALC 93 04/29/2024     Lab Results   Component Value Date    TRIG 103 01/31/2025    TRIG 91 09/04/2024    TRIG 147 04/29/2024     Lab Results   Component Value Date    TSH 0.83 05/19/2025    THYROIDPAB 171 (H) 05/19/2025         Health Maintenance:   Foot Exam:   Eye Exam:  Urine Albumin:  September 4, 2024    CGM Interpretation  14 day CGM download was reviewed in detail as documented above and will be  attached to chart.  A minimum of 72 hours of glucose data was used to inform the management plan outlined below.    Sufficient data to analyze:  Yes; CGM active 94% of the time  27% of values is above target range, which is minimally elevated above the desired goal.    72% of values is spent in target range, and thus at goal   1% of values is spent with hypoglycemia, and thus at goal           Assessment/Plan     40-year-old female presents for follow up for type 1 diabetes mellitus.  Her blood pressure is at goal.    Type 1 diabetes mellitus with diabetic microalbuminuria (Multi)  To continue with the insulin pump   To continue the use of your CGM for the intensive glucose monitoring due to the dynamic nature of insulin requirements, the narrow therapeutic index of insulin and the potentially fatal consequences of treatment  Counseled that the time in range should be >70% and thus you are at goal   Use temp basal mode at 70%; please start 4 hours prior to surgery start time     ON SURGERY DAY:  4 hours before start time:  Main menu  Insulin   Basal  Preset Temp  Select---> Begin    ONCE SURGERY IS OVER:  Main menu  Insulin  Cancel temp basal   Confirm cancel temp basal         Dizziness  You might have Meniere's disease  Please explore this possibility further with your PCP    For follow up in 4 months

## 2025-07-18 ENCOUNTER — APPOINTMENT (OUTPATIENT)
Dept: ENDOCRINOLOGY | Facility: CLINIC | Age: 40
End: 2025-07-18
Payer: COMMERCIAL

## 2025-07-18 VITALS
SYSTOLIC BLOOD PRESSURE: 132 MMHG | BODY MASS INDEX: 23.56 KG/M2 | WEIGHT: 138 LBS | HEART RATE: 97 BPM | DIASTOLIC BLOOD PRESSURE: 80 MMHG | HEIGHT: 64 IN

## 2025-07-18 DIAGNOSIS — E10.29 TYPE 1 DIABETES MELLITUS WITH DIABETIC MICROALBUMINURIA (MULTI): Primary | ICD-10-CM

## 2025-07-18 DIAGNOSIS — R80.9 TYPE 1 DIABETES MELLITUS WITH DIABETIC MICROALBUMINURIA (MULTI): Primary | ICD-10-CM

## 2025-07-18 DIAGNOSIS — R42 DIZZINESS: ICD-10-CM

## 2025-07-18 LAB
POC FINGERSTICK BLOOD GLUCOSE: 101 MG/DL (ref 70–100)
POC HEMOGLOBIN A1C: 7.3 % (ref 4.2–6.5)

## 2025-07-18 PROCEDURE — 3079F DIAST BP 80-89 MM HG: CPT | Performed by: INTERNAL MEDICINE

## 2025-07-18 PROCEDURE — 99214 OFFICE O/P EST MOD 30 MIN: CPT | Performed by: INTERNAL MEDICINE

## 2025-07-18 PROCEDURE — 4010F ACE/ARB THERAPY RXD/TAKEN: CPT | Performed by: INTERNAL MEDICINE

## 2025-07-18 PROCEDURE — 3075F SYST BP GE 130 - 139MM HG: CPT | Performed by: INTERNAL MEDICINE

## 2025-07-18 PROCEDURE — 83036 HEMOGLOBIN GLYCOSYLATED A1C: CPT | Performed by: INTERNAL MEDICINE

## 2025-07-18 PROCEDURE — 82962 GLUCOSE BLOOD TEST: CPT | Performed by: INTERNAL MEDICINE

## 2025-07-18 PROCEDURE — 95251 CONT GLUC MNTR ANALYSIS I&R: CPT | Performed by: INTERNAL MEDICINE

## 2025-07-18 PROCEDURE — 3008F BODY MASS INDEX DOCD: CPT | Performed by: INTERNAL MEDICINE

## 2025-07-18 PROCEDURE — 3051F HG A1C>EQUAL 7.0%<8.0%: CPT | Performed by: INTERNAL MEDICINE

## 2025-07-18 ASSESSMENT — ENCOUNTER SYMPTOMS: NERVOUS/ANXIOUS: 1

## 2025-07-19 PROBLEM — R42 DIZZINESS: Status: ACTIVE | Noted: 2025-07-19

## 2025-07-20 NOTE — ASSESSMENT & PLAN NOTE
You might have Meniere's disease  Please explore this possibility further with your PCP    For follow up in 4 months

## 2025-07-20 NOTE — ASSESSMENT & PLAN NOTE
To continue with the insulin pump   To continue the use of your CGM for the intensive glucose monitoring due to the dynamic nature of insulin requirements, the narrow therapeutic index of insulin and the potentially fatal consequences of treatment  Counseled that the time in range should be >70% and thus you are at goal   Use temp basal mode at 70%; please start 4 hours prior to surgery start time     ON SURGERY DAY:  4 hours before start time:  Main menu  Insulin   Basal  Preset Temp  Select---> Begin    ONCE SURGERY IS OVER:  Main menu  Insulin  Cancel temp basal   Confirm cancel temp basal

## 2025-07-22 ENCOUNTER — ANESTHESIA (OUTPATIENT)
Dept: OPERATING ROOM | Facility: HOSPITAL | Age: 40
End: 2025-07-22
Payer: COMMERCIAL

## 2025-07-22 ENCOUNTER — HOSPITAL ENCOUNTER (OUTPATIENT)
Facility: HOSPITAL | Age: 40
Setting detail: OUTPATIENT SURGERY
Discharge: HOME | End: 2025-07-22
Attending: STUDENT IN AN ORGANIZED HEALTH CARE EDUCATION/TRAINING PROGRAM | Admitting: STUDENT IN AN ORGANIZED HEALTH CARE EDUCATION/TRAINING PROGRAM
Payer: COMMERCIAL

## 2025-07-22 ENCOUNTER — PHARMACY VISIT (OUTPATIENT)
Dept: PHARMACY | Facility: CLINIC | Age: 40
End: 2025-07-22
Payer: COMMERCIAL

## 2025-07-22 VITALS
WEIGHT: 135 LBS | DIASTOLIC BLOOD PRESSURE: 79 MMHG | SYSTOLIC BLOOD PRESSURE: 121 MMHG | TEMPERATURE: 97.7 F | BODY MASS INDEX: 23.05 KG/M2 | RESPIRATION RATE: 20 BRPM | HEIGHT: 64 IN | HEART RATE: 59 BPM | OXYGEN SATURATION: 100 %

## 2025-07-22 DIAGNOSIS — G89.18 POST-OP PAIN: Primary | ICD-10-CM

## 2025-07-22 DIAGNOSIS — N93.9 ABNORMAL UTERINE BLEEDING: ICD-10-CM

## 2025-07-22 LAB — GLUCOSE BLD MANUAL STRIP-MCNC: 144 MG/DL (ref 74–99)

## 2025-07-22 PROCEDURE — 58558 HYSTEROSCOPY BIOPSY: CPT | Performed by: STUDENT IN AN ORGANIZED HEALTH CARE EDUCATION/TRAINING PROGRAM

## 2025-07-22 PROCEDURE — 2500000004 HC RX 250 GENERAL PHARMACY W/ HCPCS (ALT 636 FOR OP/ED): Performed by: ANESTHESIOLOGY

## 2025-07-22 PROCEDURE — 88305 TISSUE EXAM BY PATHOLOGIST: CPT | Mod: TC,PORLAB | Performed by: STUDENT IN AN ORGANIZED HEALTH CARE EDUCATION/TRAINING PROGRAM

## 2025-07-22 PROCEDURE — 2500000004 HC RX 250 GENERAL PHARMACY W/ HCPCS (ALT 636 FOR OP/ED)

## 2025-07-22 PROCEDURE — 7100000009 HC PHASE TWO TIME - INITIAL BASE CHARGE: Performed by: STUDENT IN AN ORGANIZED HEALTH CARE EDUCATION/TRAINING PROGRAM

## 2025-07-22 PROCEDURE — 7100000002 HC RECOVERY ROOM TIME - EACH INCREMENTAL 1 MINUTE: Performed by: STUDENT IN AN ORGANIZED HEALTH CARE EDUCATION/TRAINING PROGRAM

## 2025-07-22 PROCEDURE — 7100000010 HC PHASE TWO TIME - EACH INCREMENTAL 1 MINUTE: Performed by: STUDENT IN AN ORGANIZED HEALTH CARE EDUCATION/TRAINING PROGRAM

## 2025-07-22 PROCEDURE — 3600000003 HC OR TIME - INITIAL BASE CHARGE - PROCEDURE LEVEL THREE: Performed by: STUDENT IN AN ORGANIZED HEALTH CARE EDUCATION/TRAINING PROGRAM

## 2025-07-22 PROCEDURE — RXMED WILLOW AMBULATORY MEDICATION CHARGE

## 2025-07-22 PROCEDURE — 3600000008 HC OR TIME - EACH INCREMENTAL 1 MINUTE - PROCEDURE LEVEL THREE: Performed by: STUDENT IN AN ORGANIZED HEALTH CARE EDUCATION/TRAINING PROGRAM

## 2025-07-22 PROCEDURE — 7100000001 HC RECOVERY ROOM TIME - INITIAL BASE CHARGE: Performed by: STUDENT IN AN ORGANIZED HEALTH CARE EDUCATION/TRAINING PROGRAM

## 2025-07-22 PROCEDURE — 3700000002 HC GENERAL ANESTHESIA TIME - EACH INCREMENTAL 1 MINUTE: Performed by: STUDENT IN AN ORGANIZED HEALTH CARE EDUCATION/TRAINING PROGRAM

## 2025-07-22 PROCEDURE — 82947 ASSAY GLUCOSE BLOOD QUANT: CPT

## 2025-07-22 PROCEDURE — 3700000001 HC GENERAL ANESTHESIA TIME - INITIAL BASE CHARGE: Performed by: STUDENT IN AN ORGANIZED HEALTH CARE EDUCATION/TRAINING PROGRAM

## 2025-07-22 PROCEDURE — 96372 THER/PROPH/DIAG INJ SC/IM: CPT

## 2025-07-22 RX ORDER — MORPHINE SULFATE 2 MG/ML
2 INJECTION, SOLUTION INTRAMUSCULAR; INTRAVENOUS EVERY 5 MIN PRN
Status: DISCONTINUED | OUTPATIENT
Start: 2025-07-22 | End: 2025-07-22 | Stop reason: HOSPADM

## 2025-07-22 RX ORDER — MIDAZOLAM HYDROCHLORIDE 1 MG/ML
INJECTION, SOLUTION INTRAMUSCULAR; INTRAVENOUS AS NEEDED
Status: DISCONTINUED | OUTPATIENT
Start: 2025-07-22 | End: 2025-07-22

## 2025-07-22 RX ORDER — MEPERIDINE HYDROCHLORIDE 25 MG/ML
12.5 INJECTION INTRAMUSCULAR; INTRAVENOUS; SUBCUTANEOUS EVERY 10 MIN PRN
Status: DISCONTINUED | OUTPATIENT
Start: 2025-07-22 | End: 2025-07-22 | Stop reason: HOSPADM

## 2025-07-22 RX ORDER — KETOROLAC TROMETHAMINE 30 MG/ML
INJECTION, SOLUTION INTRAMUSCULAR; INTRAVENOUS AS NEEDED
Status: DISCONTINUED | OUTPATIENT
Start: 2025-07-22 | End: 2025-07-22

## 2025-07-22 RX ORDER — PROPOFOL 10 MG/ML
INJECTION, EMULSION INTRAVENOUS AS NEEDED
Status: DISCONTINUED | OUTPATIENT
Start: 2025-07-22 | End: 2025-07-22

## 2025-07-22 RX ORDER — ONDANSETRON HYDROCHLORIDE 2 MG/ML
4 INJECTION, SOLUTION INTRAVENOUS ONCE AS NEEDED
Status: DISCONTINUED | OUTPATIENT
Start: 2025-07-22 | End: 2025-07-22 | Stop reason: HOSPADM

## 2025-07-22 RX ORDER — ONDANSETRON HYDROCHLORIDE 2 MG/ML
INJECTION, SOLUTION INTRAVENOUS AS NEEDED
Status: DISCONTINUED | OUTPATIENT
Start: 2025-07-22 | End: 2025-07-22

## 2025-07-22 RX ORDER — LIDOCAINE HYDROCHLORIDE 10 MG/ML
0.1 INJECTION, SOLUTION EPIDURAL; INFILTRATION; INTRACAUDAL; PERINEURAL ONCE
Status: DISCONTINUED | OUTPATIENT
Start: 2025-07-22 | End: 2025-07-22 | Stop reason: HOSPADM

## 2025-07-22 RX ORDER — LABETALOL HYDROCHLORIDE 5 MG/ML
5 INJECTION, SOLUTION INTRAVENOUS ONCE AS NEEDED
Status: DISCONTINUED | OUTPATIENT
Start: 2025-07-22 | End: 2025-07-22 | Stop reason: HOSPADM

## 2025-07-22 RX ORDER — FENTANYL CITRATE 50 UG/ML
INJECTION, SOLUTION INTRAMUSCULAR; INTRAVENOUS AS NEEDED
Status: DISCONTINUED | OUTPATIENT
Start: 2025-07-22 | End: 2025-07-22

## 2025-07-22 RX ORDER — OXYCODONE AND ACETAMINOPHEN 5; 325 MG/1; MG/1
1 TABLET ORAL EVERY 4 HOURS PRN
Status: DISCONTINUED | OUTPATIENT
Start: 2025-07-22 | End: 2025-07-22 | Stop reason: HOSPADM

## 2025-07-22 RX ORDER — DROPERIDOL 2.5 MG/ML
0.62 INJECTION, SOLUTION INTRAMUSCULAR; INTRAVENOUS ONCE AS NEEDED
Status: DISCONTINUED | OUTPATIENT
Start: 2025-07-22 | End: 2025-07-22 | Stop reason: HOSPADM

## 2025-07-22 RX ORDER — SODIUM CHLORIDE, SODIUM LACTATE, POTASSIUM CHLORIDE, CALCIUM CHLORIDE 600; 310; 30; 20 MG/100ML; MG/100ML; MG/100ML; MG/100ML
100 INJECTION, SOLUTION INTRAVENOUS CONTINUOUS
Status: DISCONTINUED | OUTPATIENT
Start: 2025-07-22 | End: 2025-07-22 | Stop reason: HOSPADM

## 2025-07-22 RX ORDER — IBUPROFEN 600 MG/1
600 TABLET, FILM COATED ORAL EVERY 6 HOURS PRN
Qty: 30 TABLET | Refills: 1 | Status: SHIPPED | OUTPATIENT
Start: 2025-07-22

## 2025-07-22 RX ORDER — FAMOTIDINE 10 MG/ML
20 INJECTION, SOLUTION INTRAVENOUS ONCE
Status: COMPLETED | OUTPATIENT
Start: 2025-07-22 | End: 2025-07-22

## 2025-07-22 RX ORDER — ONDANSETRON 4 MG/1
4 TABLET, ORALLY DISINTEGRATING ORAL EVERY 8 HOURS PRN
Qty: 40 TABLET | Refills: 1 | Status: SHIPPED | OUTPATIENT
Start: 2025-07-22

## 2025-07-22 RX ORDER — HYDRALAZINE HYDROCHLORIDE 20 MG/ML
5 INJECTION INTRAMUSCULAR; INTRAVENOUS EVERY 30 MIN PRN
Status: DISCONTINUED | OUTPATIENT
Start: 2025-07-22 | End: 2025-07-22 | Stop reason: HOSPADM

## 2025-07-22 RX ORDER — SODIUM CHLORIDE, SODIUM LACTATE, POTASSIUM CHLORIDE, CALCIUM CHLORIDE 600; 310; 30; 20 MG/100ML; MG/100ML; MG/100ML; MG/100ML
75 INJECTION, SOLUTION INTRAVENOUS CONTINUOUS
Status: DISCONTINUED | OUTPATIENT
Start: 2025-07-22 | End: 2025-07-22 | Stop reason: HOSPADM

## 2025-07-22 RX ORDER — DIPHENHYDRAMINE HYDROCHLORIDE 50 MG/ML
12.5 INJECTION, SOLUTION INTRAMUSCULAR; INTRAVENOUS ONCE AS NEEDED
Status: DISCONTINUED | OUTPATIENT
Start: 2025-07-22 | End: 2025-07-22 | Stop reason: HOSPADM

## 2025-07-22 RX ORDER — ALBUTEROL SULFATE 0.83 MG/ML
2.5 SOLUTION RESPIRATORY (INHALATION) ONCE AS NEEDED
Status: DISCONTINUED | OUTPATIENT
Start: 2025-07-22 | End: 2025-07-22 | Stop reason: HOSPADM

## 2025-07-22 RX ORDER — LIDOCAINE HYDROCHLORIDE 20 MG/ML
INJECTION, SOLUTION INFILTRATION; PERINEURAL AS NEEDED
Status: DISCONTINUED | OUTPATIENT
Start: 2025-07-22 | End: 2025-07-22

## 2025-07-22 RX ADMIN — SODIUM CHLORIDE, POTASSIUM CHLORIDE, SODIUM LACTATE AND CALCIUM CHLORIDE: 600; 310; 30; 20 INJECTION, SOLUTION INTRAVENOUS at 10:07

## 2025-07-22 RX ADMIN — PROPOFOL 200 MG: 10 INJECTION, EMULSION INTRAVENOUS at 10:09

## 2025-07-22 RX ADMIN — FENTANYL CITRATE 50 MCG: 50 INJECTION INTRAMUSCULAR; INTRAVENOUS at 10:21

## 2025-07-22 RX ADMIN — KETOROLAC TROMETHAMINE 30 MG: 30 INJECTION, SOLUTION INTRAMUSCULAR at 10:09

## 2025-07-22 RX ADMIN — ONDANSETRON 4 MG: 2 INJECTION, SOLUTION INTRAMUSCULAR; INTRAVENOUS at 10:09

## 2025-07-22 RX ADMIN — DEXAMETHASONE SODIUM PHOSPHATE 4 MG: 4 INJECTION, SOLUTION INTRAMUSCULAR; INTRAVENOUS at 10:09

## 2025-07-22 RX ADMIN — SODIUM CHLORIDE, SODIUM LACTATE, POTASSIUM CHLORIDE, AND CALCIUM CHLORIDE 75 ML/HR: .6; .31; .03; .02 INJECTION, SOLUTION INTRAVENOUS at 08:33

## 2025-07-22 RX ADMIN — FENTANYL CITRATE 50 MCG: 50 INJECTION INTRAMUSCULAR; INTRAVENOUS at 10:09

## 2025-07-22 RX ADMIN — FAMOTIDINE 20 MG: 10 INJECTION, SOLUTION INTRAVENOUS at 08:33

## 2025-07-22 RX ADMIN — MIDAZOLAM 2 MG: 1 INJECTION INTRAMUSCULAR; INTRAVENOUS at 09:59

## 2025-07-22 RX ADMIN — LIDOCAINE HYDROCHLORIDE 80 MG: 20 INJECTION, SOLUTION INFILTRATION; PERINEURAL at 10:09

## 2025-07-22 SDOH — HEALTH STABILITY: MENTAL HEALTH: CURRENT SMOKER: 1

## 2025-07-22 ASSESSMENT — PAIN SCALES - GENERAL
PAIN_LEVEL: 0
PAINLEVEL_OUTOF10: 0 - NO PAIN

## 2025-07-22 ASSESSMENT — PAIN - FUNCTIONAL ASSESSMENT
PAIN_FUNCTIONAL_ASSESSMENT: 0-10
PAIN_FUNCTIONAL_ASSESSMENT: 0-10
PAIN_FUNCTIONAL_ASSESSMENT: UNABLE TO SELF-REPORT

## 2025-07-22 ASSESSMENT — COLUMBIA-SUICIDE SEVERITY RATING SCALE - C-SSRS
1. IN THE PAST MONTH, HAVE YOU WISHED YOU WERE DEAD OR WISHED YOU COULD GO TO SLEEP AND NOT WAKE UP?: NO
2. HAVE YOU ACTUALLY HAD ANY THOUGHTS OF KILLING YOURSELF?: NO
6. HAVE YOU EVER DONE ANYTHING, STARTED TO DO ANYTHING, OR PREPARED TO DO ANYTHING TO END YOUR LIFE?: NO

## 2025-07-22 NOTE — H&P
History Of Present Illness  Arlene Moore is a 40 y.o. female   She is having irregular periods. She will start bleeding after intercourse. She will bleed 5 days at a time, then get a few days with no bleeding. She will go through a super sized tampon every few hours. If she has intercourse, she will start bleeding again. She has some vasomotor symptoms as well. She had a fan at work and by her bed. This went on for the past year. She would have >5 hot flashes at a time. She will get intermittently overheated. She was recently diagnosed with DM1.     She currently takes effexor. She has cHTN as well-stable on Lisinopril and Norvasc.     Her bleeding has stopped for now.     No fevers, chills. No HA/vision changes. No RUQ pain, n/v. No chest pain or SOB. No calf pain.     Past Medical History  She has a past medical history of Attention-deficit hyperactivity disorder, predominantly hyperactive type (2022) and Type 1 diabetes (Multi).    Surgical History  She has a past surgical history that includes Other surgical history (2019) and Other surgical history (2019).     OB History  OB History    Para Term  AB Living   3 3 3  0 3   SAB IAB Ectopic Multiple Live Births   0 0 0 0 3      # Outcome Date GA Lbr Osmin/2nd Weight Sex Type Anes PTL Lv   3 Term            2 Term            1 Term                Sexual History  Social History     Substance and Sexual Activity   Sexual Activity Yes    Partners: Male    Birth control/protection: Male Sterilization    Comment: vasectomy        Social History  She reports that she has been smoking cigarettes. She has never used smokeless tobacco. She reports that she does not currently use alcohol. She reports current drug use. Drug: Marijuana.    Family History  Family History[1]     Allergies  Patient has no known allergies.    Review of Systems   All other systems reviewed and are negative.       Physical Exam   General: A&Ox3  Head: Normocephalic,  "atraumatic  Heart/Lungs: RRR, No murmurs, gallops, or rubs. Lungs are CTAB.  Abdomen: Soft, nontender. BS+4. No bruising or masses.  Lower Extremities: No lower extremity Edema no palpable cords.       Last Recorded Vitals  Blood pressure (!) 130/96, pulse 67, temperature 37.1 °C (98.7 °F), temperature source Temporal, resp. rate 21, height 1.626 m (5' 4\"), weight 61.2 kg (135 lb), last menstrual period 07/08/2025, SpO2 100%.    Relevant Results  Medications  Current Medications[2]    Labs  CBC  No results found for: \"WBC\", \"NRBC\", \"RBC\", \"HGB\", \"HCT\", \"MCV\", \"MCH\", \"MCHC\", \"RDW\", \"PLT\", \"MPV\"    CMP  No results found for: \"GLUCOSE\", \"NA\", \"K\", \"CL\", \"CO2\", \"ANIONGAP\", \"BUN\", \"CREATININE\", \"EGFR\", \"CALCIUM\", \"ALBUMIN\", \"ALKPHOS\", \"PROT\", \"AST\", \"BILITOT\", \"ALT\"    Coagulation   No results found for: \"PROTIME\", \"INR\", \"APTT\", \"FIBRINOGEN\"    Pregnancy Tests  No results found for: \"HCGQUANT\", \"HCGURINE\"    Imaging   Results for orders placed during the hospital encounter of 05/20/25    US PELVIS TRANSABDOMINAL WITH TRANSVAGINAL    Narrative  Interpreted By:  Balta Erazo,  STUDY:  US PELVIS TRANSABDOMINAL WITH TRANSVAGINAL;  5/20/2025 9:15 am    INDICATION:  Signs/Symptoms:AUB.    COMPARISON:  None.    ACCESSION NUMBER(S):  [id number]    ORDERING CLINICIAN:  CELIA CANDELARIO    TECHNIQUE:  Multiple multiplanar static gray scale, color and spectral waveform  sonographic images of the pelvis were obtained.  Transabdominal and  endovaginal ultrasound was performed.    FINDINGS:  UTERUS:  The uterus measures 5.4cm x 3.8cm x 8.1cm.  The myometrium is  homogeneous.    ENDOMETRIUM:  The endometrium measures a thickness of 0.7cm, which is normal. There  is slight heterogeneity of the endometrium, nonspecific and probably  normal variation. However a 7 mm area suggests an underlying  endometrial polyp. Follow-up would be recommended if symptomatology  persists.    RIGHT ADNEXA:  The right ovary measures 1.9cm x 2.6cm x 3cm and " demonstrates normal  blood flow. Parenchymal texture:  There are several small follicles.  Additionally there is an approximate 1.2 x 1.3 cm hypoechogenicity  which is probably a hemorrhagic follicle. There is also an  approximate 7 x 6 x 3 mm hyperechogenicity within the ovary. This may  be due to an additional hemorrhagic follicle, but a dermoid or  teratoma or endometrioma is possible. Follow-up would be recommended  if differentiation is needed.    LEFT ADNEXA:  The left ovary measures 2.5cm x 2cm x 3.3cm and demonstrates normal  blood flow. Parenchymal texture:  Several follicles, otherwise  homogeneous.      CUL DE SAC:  No gross free fluid is seen in the pelvic cul-de-sac.    OTHER:  None significant.    Impression  1.  Slightly heterogeneous endometrium, nonspecific but could mask  small underlying 7 mm polyp.  2. Echogenicity of the right ovary, which may be a dermoid or  teratoma. Follow-up would be recommended.    Signed by: Balta Erazo 5/21/2025 4:07 PM  Dictation workstation:   [id number]         Assessment/Plan   Assessment & Plan  Abnormal uterine bleeding  Patient has heavier menstrual bleeding triggered by intercourse.  TVUS shows possible polyp. Given persistence of bleeding and no obvious other etiology, endometrium should be evaluated. Plan for Hysteroscopy, D&C, possible Myosure.  TSH normal, but TPO positive.   Pap reviewed, wnl.     Jez Zapata MD         [1] No family history on file.  [2]   Current Facility-Administered Medications:     famotidine PF (Pepcid) injection 20 mg, 20 mg, intravenous, Once, Quan Hernandez MD    lactated Ringer's infusion, 75 mL/hr, intravenous, Continuous, Quan Hernandez MD

## 2025-07-22 NOTE — ANESTHESIA PREPROCEDURE EVALUATION
Patient: Arlene Moore    Procedure Information       Date/Time: 07/22/25 0915    Procedure: Hysteroscopy dilation and curettage with poss. Myosure (Pelvis) - Hysteroscopy dilation and curettage with poss. Myosure    Location: [facility] / [facility]    Surgeons: Jez Zapata MD            Relevant Problems   Cardiac   (+) Primary hypertension      Neuro   (+) Generalized anxiety disorder   (+) Panic disorder (episodic paroxysmal anxiety)   (+) Severe episode of recurrent major depressive disorder, without psychotic features (Multi)      /Renal   (+) Recurrent UTI (urinary tract infection)      Endocrine   (+) Graves disease   (+) Type 1 diabetes mellitus with diabetic microalbuminuria (Multi)      ID   (+) Recurrent UTI (urinary tract infection)      GYN   (+) Abnormal uterine bleeding       Clinical information reviewed:   Tobacco  Allergies  Meds   Med Hx  Surg Hx  OB Status  Fam Hx  Soc   Hx        NPO Detail:  NPO/Void Status  Date of Last Liquid: 07/21/25  Time of Last Liquid: 2200  Date of Last Solid: 07/21/25  Time of Last Solid: 2200         Physical Exam    Airway  Mallampati: II  TM distance: >3 FB  Neck ROM: full  Mouth opening: 3 or more finger widths     Cardiovascular - normal exam   Dental - normal exam     Pulmonary - normal exam   Abdominal - normal exam           Anesthesia Plan    History of general anesthesia?: yes  History of complications of general anesthesia?: no    ASA 2     general     The patient is a current smoker.  Patient was previously instructed to abstain from smoking on day of procedure.  Patient smoked on day of procedure.    intravenous induction   Postoperative pain plan includes opioids.  Anesthetic plan and risks discussed with patient.    Plan discussed with CRNA.

## 2025-07-22 NOTE — ANESTHESIA POSTPROCEDURE EVALUATION
Patient: Arlene Moore    Procedure Summary       Date: 07/22/25 Room / Location: [facility] / [facility]    Anesthesia Start: 0958 Anesthesia Stop: 1042    Procedure: Hysteroscopy dilation and curettage (Pelvis) Diagnosis:       Abnormal uterine bleeding      (Abnormal uterine bleeding [N93.9])    Surgeons: Jez Zapata MD Responsible Provider: SALEEM Johnson    Anesthesia Type: general ASA Status: 2            Anesthesia Type: general    Vitals Value Taken Time   /86 07/22/25 11:20   Temp 36.5 °C (97.7 °F) 07/22/25 11:20   Pulse 61 07/22/25 11:20   Resp 25 07/22/25 11:20   SpO2 100 % 07/22/25 11:20       Anesthesia Post Evaluation    Patient location during evaluation: PACU  Patient participation: complete - patient participated  Level of consciousness: awake  Pain score: 0  Pain management: adequate  Airway patency: patent  Cardiovascular status: acceptable  Respiratory status: acceptable  Hydration status: acceptable  Postoperative Nausea and Vomiting: none        There were no known notable events for this encounter.

## 2025-07-22 NOTE — ANESTHESIA PROCEDURE NOTES
Airway  Date/Time: 7/22/2025 10:10 AM  Reason: elective    Airway not difficult    Staffing  Performed: CRNA   Authorized by: SALEEM Johnson    Performed by: SALEEM Johnson  Patient location during procedure: OR    Patient Condition  Indications for airway management: anesthesia  Patient position: sniffing  Sedation level: deep     Final Airway Details   Preoxygenated: yes  Final airway type: supraglottic airway  Successful airway:   Size: 4  Number of attempts at approach: 1  Number of other approaches attempted: 0

## 2025-07-22 NOTE — OP NOTE
Hysteroscopy dilation and curettage Operative Note     Date: 2025  OR Location: [facility]    Name: Arlene Moore, : 1985, Age: 40 y.o., MRN: [id number], Sex: female    Diagnosis  Pre-op Diagnosis      * Abnormal uterine bleeding [N93.9] Post-op Diagnosis     * Abnormal uterine bleeding [N93.9]     Procedures  Hysteroscopy dilation and curettage  66204 - [place] HYSTEROSCOPY BX ENDOMETRIUM&/POLYPC W/WO D&C      Surgeons      * Jez Zapata - Primary    Resident/Fellow/Other Assistant:  Surgeons and Role:  * No surgeons found with a matching role *    Staff:   Circulator: Ivette  Scrub Person: Charbel  Relief Circulator: Nayeli  Scrub Person: Debo    Anesthesia Staff: CRNA: BELL Johnson-LUCIAN    Procedure Summary  Anesthesia: General  ASA: II  Estimated Blood Loss: 5 mL  Intra-op Medications:   Administrations occurring from 0915 to 1015 on 25:   Medication Name Total Dose   dexAMETHasone (Decadron) 4 mg/mL IV Syringe 2 mL 4 mg   fentaNYL (Sublimaze) injection 50 mcg/mL 50 mcg   ketorolac (Toradol) IM injection 60 mg/2 mL 30 mg   LR bolus Cannot be calculated   lidocaine (Xylocaine) injection 2 % 80 mg   midazolam (Versed) injection 1 mg/mL 2 mg   ondansetron (Zofran) 2 mg/mL injection 4 mg   propofol (Diprivan) injection 10 mg/mL 200 mg              Anesthesia Record               Intraprocedure I/O Totals       None           Specimen:   ID Type Source Tests Collected by Time   1 : ENDOMETRIAL CURETTINGS Tissue ENDOMETRIUM CURETTINGS SURGICAL PATHOLOGY EXAM Jez Zapata MD 2025 1030                 Drains and/or Catheters: * None in log *    Tourniquet Times:         Implants:     Findings: Septate uterus. No obvious polyps. Endometrium is generally thickened and irregular. Most thickened portion noted on the posterior wall. Normal appearing cervix.    Indications: Arlene Moore is an 40 y.o. female who is having surgery for Abnormal uterine bleeding [N93.9].     The patient was seen in  the preoperative area. The risks, benefits, complications, treatment options, non-operative alternatives, expected recovery and outcomes were discussed with the patient. The possibilities of reaction to medication, pulmonary aspiration, injury to surrounding structures, bleeding, recurrent infection, the need for additional procedures, failure to diagnose a condition, and creating a complication requiring transfusion or operation were discussed with the patient. The patient concurred with the proposed plan, giving informed consent.  The site of surgery was properly noted/marked if necessary per policy. The patient has been actively warmed in preoperative area. Preoperative antibiotics are not indicated. Venous thrombosis prophylaxis have been ordered including bilateral sequential compression devices    The patient was taken back to the operating room. General anesthesia was administered and found to be adequate. The patient was prepped and draped in the usual sterile fashion with her legs in yellow fin stir-ups. A razo was placed.     A weighted speculum was placed in the vagina. The anterior lip of the cervix was grasped with a single tooth tenaculum. The cervix was dilated and a the hysteroscope was introduced with the above findings. Sharp curettes were then used to perform a D&C. Several passes were made until a gritty texture was felt.    All instruments were removed from the vagina. The patient tolerated the procedure well. Sponge/lap/instrument and needle counts were correct x2 at the end of the procedure. The patient was taken back to the recovery room in good condition.      Evidence of Infection: No   Complications:  None; patient tolerated the procedure well.    Disposition: PACU - hemodynamically stable.  Condition: stable           Jez M Jodi  Phone Number: [phone]

## 2025-07-22 NOTE — DISCHARGE INSTRUCTIONS
Call your provider if you experience:  Notify provider for bad-smelling vaginal blood or discharge  Notify provider for fever or chills  Notify provider for heavy bleeding  Soaking 2 large pads every hour or passing large clots.  Notify provider for incision that is not healing, is red or more painful, or has pus or drainage  Notify provider for pain, burning or difficulty with emptying your bladder  Notify provider for red or swollen leg that is painful or warm to touch     No tub baths for 1 week. No intercourse for 1 week.

## 2025-07-22 NOTE — ASSESSMENT & PLAN NOTE
Patient has heavier menstrual bleeding triggered by intercourse.  TVUS shows possible polyp. Given persistence of bleeding and no obvious other etiology, endometrium should be evaluated. Plan for Hysteroscopy, D&C, possible Myosure.  TSH normal, but TPO positive.   Pap reviewed, wnl.

## 2025-07-25 LAB
LABORATORY COMMENT REPORT: NORMAL
PATH REPORT.FINAL DX SPEC: NORMAL
PATH REPORT.GROSS SPEC: NORMAL
PATH REPORT.RELEVANT HX SPEC: NORMAL
PATH REPORT.TOTAL CANCER: NORMAL

## 2025-08-06 ENCOUNTER — APPOINTMENT (OUTPATIENT)
Dept: OBSTETRICS AND GYNECOLOGY | Facility: CLINIC | Age: 40
End: 2025-08-06
Payer: COMMERCIAL

## 2025-08-06 VITALS — BODY MASS INDEX: 22.93 KG/M2 | SYSTOLIC BLOOD PRESSURE: 114 MMHG | WEIGHT: 133.6 LBS | DIASTOLIC BLOOD PRESSURE: 82 MMHG

## 2025-08-06 DIAGNOSIS — Z48.89 POSTOPERATIVE VISIT: Primary | ICD-10-CM

## 2025-08-06 PROCEDURE — 3051F HG A1C>EQUAL 7.0%<8.0%: CPT | Performed by: STUDENT IN AN ORGANIZED HEALTH CARE EDUCATION/TRAINING PROGRAM

## 2025-08-06 PROCEDURE — 99213 OFFICE O/P EST LOW 20 MIN: CPT | Performed by: STUDENT IN AN ORGANIZED HEALTH CARE EDUCATION/TRAINING PROGRAM

## 2025-08-06 PROCEDURE — 3074F SYST BP LT 130 MM HG: CPT | Performed by: STUDENT IN AN ORGANIZED HEALTH CARE EDUCATION/TRAINING PROGRAM

## 2025-08-06 PROCEDURE — 4010F ACE/ARB THERAPY RXD/TAKEN: CPT | Performed by: STUDENT IN AN ORGANIZED HEALTH CARE EDUCATION/TRAINING PROGRAM

## 2025-08-06 PROCEDURE — 3079F DIAST BP 80-89 MM HG: CPT | Performed by: STUDENT IN AN ORGANIZED HEALTH CARE EDUCATION/TRAINING PROGRAM

## 2025-08-11 ENCOUNTER — HOSPITAL ENCOUNTER (OUTPATIENT)
Dept: RADIOLOGY | Facility: HOSPITAL | Age: 40
Discharge: HOME | End: 2025-08-11
Payer: COMMERCIAL

## 2025-08-11 VITALS — WEIGHT: 135 LBS | HEIGHT: 63 IN | BODY MASS INDEX: 23.92 KG/M2

## 2025-08-11 DIAGNOSIS — Z12.31 SCREENING MAMMOGRAM FOR BREAST CANCER: ICD-10-CM

## 2025-08-11 DIAGNOSIS — N83.209 CYST OF OVARY, UNSPECIFIED LATERALITY: ICD-10-CM

## 2025-08-11 PROCEDURE — 77067 SCR MAMMO BI INCL CAD: CPT | Performed by: RADIOLOGY

## 2025-08-11 PROCEDURE — 77063 BREAST TOMOSYNTHESIS BI: CPT | Performed by: RADIOLOGY

## 2025-08-11 PROCEDURE — 77067 SCR MAMMO BI INCL CAD: CPT

## 2025-08-11 PROCEDURE — 76856 US EXAM PELVIC COMPLETE: CPT

## 2025-08-11 PROCEDURE — 76856 US EXAM PELVIC COMPLETE: CPT | Performed by: RADIOLOGY

## 2025-08-11 PROCEDURE — 76830 TRANSVAGINAL US NON-OB: CPT | Performed by: RADIOLOGY

## 2025-08-12 ENCOUNTER — RESULTS FOLLOW-UP (OUTPATIENT)
Dept: PRIMARY CARE | Facility: CLINIC | Age: 40
End: 2025-08-12
Payer: COMMERCIAL

## 2025-08-12 DIAGNOSIS — R92.8 ABNORMALITY OF LEFT BREAST ON SCREENING MAMMOGRAM: Primary | ICD-10-CM

## 2025-08-12 PROBLEM — Z48.89 POSTOPERATIVE VISIT: Status: ACTIVE | Noted: 2025-08-12

## 2025-08-13 ENCOUNTER — APPOINTMENT (OUTPATIENT)
Dept: PRIMARY CARE | Facility: CLINIC | Age: 40
End: 2025-08-13
Payer: COMMERCIAL

## 2025-08-13 VITALS
HEART RATE: 78 BPM | BODY MASS INDEX: 24.66 KG/M2 | WEIGHT: 139.2 LBS | SYSTOLIC BLOOD PRESSURE: 137 MMHG | DIASTOLIC BLOOD PRESSURE: 97 MMHG | OXYGEN SATURATION: 100 % | HEIGHT: 63 IN

## 2025-08-13 DIAGNOSIS — E10.29 TYPE 1 DIABETES MELLITUS WITH DIABETIC MICROALBUMINURIA (MULTI): ICD-10-CM

## 2025-08-13 DIAGNOSIS — R80.9 TYPE 1 DIABETES MELLITUS WITH DIABETIC MICROALBUMINURIA (MULTI): ICD-10-CM

## 2025-08-13 DIAGNOSIS — N93.9 ABNORMAL UTERINE BLEEDING: ICD-10-CM

## 2025-08-13 DIAGNOSIS — F33.2 SEVERE EPISODE OF RECURRENT MAJOR DEPRESSIVE DISORDER, WITHOUT PSYCHOTIC FEATURES (MULTI): Primary | ICD-10-CM

## 2025-08-13 DIAGNOSIS — R93.89 THICKENED ENDOMETRIUM: Primary | ICD-10-CM

## 2025-08-13 DIAGNOSIS — I10 PRIMARY HYPERTENSION: ICD-10-CM

## 2025-08-13 DIAGNOSIS — R92.8 ABNORMALITY OF LEFT BREAST ON SCREENING MAMMOGRAM: ICD-10-CM

## 2025-08-13 DIAGNOSIS — E05.00 GRAVES DISEASE: ICD-10-CM

## 2025-08-13 DIAGNOSIS — F41.0 PANIC DISORDER (EPISODIC PAROXYSMAL ANXIETY): ICD-10-CM

## 2025-08-13 PROBLEM — N39.0 RECURRENT UTI (URINARY TRACT INFECTION): Status: RESOLVED | Noted: 2023-11-13 | Resolved: 2025-08-13

## 2025-08-13 PROBLEM — Z48.89 POSTOPERATIVE VISIT: Status: RESOLVED | Noted: 2025-08-12 | Resolved: 2025-08-13

## 2025-08-13 PROBLEM — R42 DIZZINESS: Status: RESOLVED | Noted: 2025-07-19 | Resolved: 2025-08-13

## 2025-08-13 PROBLEM — F41.1 GENERALIZED ANXIETY DISORDER: Status: RESOLVED | Noted: 2023-04-13 | Resolved: 2025-08-13

## 2025-08-13 PROCEDURE — 3075F SYST BP GE 130 - 139MM HG: CPT | Performed by: INTERNAL MEDICINE

## 2025-08-13 PROCEDURE — 4010F ACE/ARB THERAPY RXD/TAKEN: CPT | Performed by: INTERNAL MEDICINE

## 2025-08-13 PROCEDURE — 3051F HG A1C>EQUAL 7.0%<8.0%: CPT | Performed by: INTERNAL MEDICINE

## 2025-08-13 PROCEDURE — 3080F DIAST BP >= 90 MM HG: CPT | Performed by: INTERNAL MEDICINE

## 2025-08-13 PROCEDURE — 3008F BODY MASS INDEX DOCD: CPT | Performed by: INTERNAL MEDICINE

## 2025-08-13 PROCEDURE — 99214 OFFICE O/P EST MOD 30 MIN: CPT | Performed by: INTERNAL MEDICINE

## 2025-08-13 RX ORDER — ARIPIPRAZOLE 5 MG/1
5 TABLET ORAL NIGHTLY
Qty: 30 TABLET | Refills: 0 | Status: SHIPPED | OUTPATIENT
Start: 2025-08-13 | End: 2025-08-13 | Stop reason: WASHOUT

## 2025-08-13 RX ORDER — ARIPIPRAZOLE 10 MG/1
10 TABLET ORAL DAILY
Qty: 30 TABLET | Refills: 3 | Status: SHIPPED | OUTPATIENT
Start: 2025-08-13 | End: 2025-09-12

## 2025-08-13 ASSESSMENT — PATIENT HEALTH QUESTIONNAIRE - PHQ9
1. LITTLE INTEREST OR PLEASURE IN DOING THINGS: NOT AT ALL
SUM OF ALL RESPONSES TO PHQ9 QUESTIONS 1 AND 2: 1
2. FEELING DOWN, DEPRESSED OR HOPELESS: SEVERAL DAYS

## 2025-08-18 ENCOUNTER — TELEMEDICINE (OUTPATIENT)
Dept: PHARMACY | Facility: HOSPITAL | Age: 40
End: 2025-08-18
Payer: COMMERCIAL

## 2025-08-18 ENCOUNTER — APPOINTMENT (OUTPATIENT)
Dept: OBSTETRICS AND GYNECOLOGY | Facility: CLINIC | Age: 40
End: 2025-08-18
Payer: COMMERCIAL

## 2025-08-18 DIAGNOSIS — F33.2 SEVERE EPISODE OF RECURRENT MAJOR DEPRESSIVE DISORDER, WITHOUT PSYCHOTIC FEATURES (MULTI): ICD-10-CM

## 2025-08-18 DIAGNOSIS — E05.00 GRAVES DISEASE: ICD-10-CM

## 2025-08-18 DIAGNOSIS — R80.9 TYPE 1 DIABETES MELLITUS WITH DIABETIC MICROALBUMINURIA (MULTI): ICD-10-CM

## 2025-08-18 DIAGNOSIS — E10.29 TYPE 1 DIABETES MELLITUS WITH DIABETIC MICROALBUMINURIA (MULTI): ICD-10-CM

## 2025-08-19 ENCOUNTER — HOSPITAL ENCOUNTER (OUTPATIENT)
Dept: RADIOLOGY | Facility: HOSPITAL | Age: 40
Discharge: HOME | End: 2025-08-19
Payer: COMMERCIAL

## 2025-08-19 DIAGNOSIS — R92.8 ABNORMALITY OF LEFT BREAST ON SCREENING MAMMOGRAM: ICD-10-CM

## 2025-08-19 PROCEDURE — 77061 BREAST TOMOSYNTHESIS UNI: CPT | Mod: LEFT SIDE

## 2025-08-19 PROCEDURE — 77065 DX MAMMO INCL CAD UNI: CPT | Mod: LT

## 2025-08-19 PROCEDURE — 77065 DX MAMMO INCL CAD UNI: CPT | Mod: LEFT SIDE

## 2025-09-11 ENCOUNTER — APPOINTMENT (OUTPATIENT)
Dept: RADIOLOGY | Facility: HOSPITAL | Age: 40
End: 2025-09-11
Payer: COMMERCIAL

## 2025-09-19 ENCOUNTER — APPOINTMENT (OUTPATIENT)
Dept: ENDOCRINOLOGY | Facility: CLINIC | Age: 40
End: 2025-09-19
Payer: COMMERCIAL

## 2025-11-14 ENCOUNTER — APPOINTMENT (OUTPATIENT)
Dept: ENDOCRINOLOGY | Facility: CLINIC | Age: 40
End: 2025-11-14
Payer: COMMERCIAL

## 2025-11-26 ENCOUNTER — APPOINTMENT (OUTPATIENT)
Dept: PRIMARY CARE | Facility: CLINIC | Age: 40
End: 2025-11-26
Payer: COMMERCIAL

## (undated) DEVICE — GOWN, ASTOUND, XL

## (undated) DEVICE — TUBING, SUCTION, NON-CONDUCTIVE,W/MALE CONNECT, 6MM X 12 FT, STERILE

## (undated) DEVICE — PAD, SANITARY, OBSTETRICAL, W/ADHSV STRIP,11 IN,LF

## (undated) DEVICE — COVER HANDLE LIGHT, STERIS, BLUE, STERILE

## (undated) DEVICE — Device

## (undated) DEVICE — GLOVE, SURGICAL, PROTEXIS PI ORTHO, 6.5, PF, LF

## (undated) DEVICE — TOWEL PACK, STERILE, 4/PACK, BLUE

## (undated) DEVICE — BAG, PRESSURE INFUSER, 3000 CC, LF

## (undated) DEVICE — IRRIGATION SET, CYSTOSCOPY, REGULATING CLAMP, STRAIGHT, 81 IN

## (undated) DEVICE — PREP, SCRUB, SKIN, FOAM, HIBICLENS, 4 OZ

## (undated) DEVICE — DRAPE, UNDERBUTTOCKS, W/ 27IN FLUID POUCH

## (undated) DEVICE — PREP TRAY, SKIN, DRY, W/GLOVES

## (undated) DEVICE — CATHETER, RED RUBBER 14FR

## (undated) DEVICE — SOLUTION, IRRIGATION, SODIUM CHLORIDE 0.9%, 1000 ML, POUR BOTTLE